# Patient Record
Sex: MALE | Race: WHITE | NOT HISPANIC OR LATINO | Employment: UNEMPLOYED | URBAN - METROPOLITAN AREA
[De-identification: names, ages, dates, MRNs, and addresses within clinical notes are randomized per-mention and may not be internally consistent; named-entity substitution may affect disease eponyms.]

---

## 2024-09-16 LAB — HBA1C MFR BLD: 6.9 % (ref 4.8–5.6)

## 2024-09-27 ENCOUNTER — OFFICE VISIT (OUTPATIENT)
Dept: INTERNAL MEDICINE | Age: 74
End: 2024-09-27
Payer: MEDICARE

## 2024-09-27 VITALS
DIASTOLIC BLOOD PRESSURE: 90 MMHG | BODY MASS INDEX: 46.65 KG/M2 | WEIGHT: 315 LBS | HEIGHT: 69 IN | OXYGEN SATURATION: 92 % | HEART RATE: 113 BPM | TEMPERATURE: 97.8 F | SYSTOLIC BLOOD PRESSURE: 130 MMHG

## 2024-09-27 DIAGNOSIS — Z76.89 ENCOUNTER TO ESTABLISH CARE: ICD-10-CM

## 2024-09-27 DIAGNOSIS — F32.A DEPRESSIVE DISORDER: ICD-10-CM

## 2024-09-27 DIAGNOSIS — E11.9 TYPE 2 DIABETES MELLITUS WITHOUT COMPLICATION, WITH LONG-TERM CURRENT USE OF INSULIN: ICD-10-CM

## 2024-09-27 DIAGNOSIS — E55.9 VITAMIN D DEFICIENCY: ICD-10-CM

## 2024-09-27 DIAGNOSIS — Z12.5 PROSTATE CANCER SCREENING: ICD-10-CM

## 2024-09-27 DIAGNOSIS — T78.40XS ALLERGY, SEQUELA: ICD-10-CM

## 2024-09-27 DIAGNOSIS — Z79.4 TYPE 2 DIABETES MELLITUS WITHOUT COMPLICATION, WITH LONG-TERM CURRENT USE OF INSULIN: ICD-10-CM

## 2024-09-27 DIAGNOSIS — J44.9 COPD MIXED TYPE: ICD-10-CM

## 2024-09-27 DIAGNOSIS — K21.9 GASTROESOPHAGEAL REFLUX DISEASE WITHOUT ESOPHAGITIS: ICD-10-CM

## 2024-09-27 DIAGNOSIS — Z11.59 NEED FOR HEPATITIS C SCREENING TEST: ICD-10-CM

## 2024-09-27 DIAGNOSIS — E06.3 HYPOTHYROIDISM DUE TO HASHIMOTO'S THYROIDITIS: ICD-10-CM

## 2024-09-27 DIAGNOSIS — G47.33 OBSTRUCTIVE SLEEP APNEA SYNDROME: ICD-10-CM

## 2024-09-27 DIAGNOSIS — E03.8 HYPOTHYROIDISM DUE TO HASHIMOTO'S THYROIDITIS: ICD-10-CM

## 2024-09-27 DIAGNOSIS — I48.11 LONGSTANDING PERSISTENT ATRIAL FIBRILLATION: Primary | ICD-10-CM

## 2024-09-27 DIAGNOSIS — I10 ESSENTIAL HYPERTENSION: ICD-10-CM

## 2024-09-27 DIAGNOSIS — E78.2 MIXED HYPERLIPIDEMIA: ICD-10-CM

## 2024-09-27 PROBLEM — I48.91 ATRIAL FIBRILLATION: Status: ACTIVE | Noted: 2017-09-26

## 2024-09-27 PROBLEM — J44.1 ACUTE EXACERBATION OF CHRONIC OBSTRUCTIVE PULMONARY DISEASE: Status: ACTIVE | Noted: 2017-09-26

## 2024-09-27 PROBLEM — E03.9 HYPOTHYROIDISM: Status: ACTIVE | Noted: 2017-09-26

## 2024-09-27 PROBLEM — E66.9 OBESITY: Status: ACTIVE | Noted: 2017-12-20

## 2024-09-27 LAB
25(OH)D3 SERPL-MCNC: 7.5 NG/ML (ref 30–100)
HCV AB SER QL: NORMAL
PSA SERPL-MCNC: 0.06 NG/ML (ref 0–4)

## 2024-09-27 PROCEDURE — 82306 VITAMIN D 25 HYDROXY: CPT | Performed by: NURSE PRACTITIONER

## 2024-09-27 PROCEDURE — 86803 HEPATITIS C AB TEST: CPT | Performed by: NURSE PRACTITIONER

## 2024-09-27 PROCEDURE — 1126F AMNT PAIN NOTED NONE PRSNT: CPT | Performed by: NURSE PRACTITIONER

## 2024-09-27 PROCEDURE — 1160F RVW MEDS BY RX/DR IN RCRD: CPT | Performed by: NURSE PRACTITIONER

## 2024-09-27 PROCEDURE — 36415 COLL VENOUS BLD VENIPUNCTURE: CPT | Performed by: NURSE PRACTITIONER

## 2024-09-27 PROCEDURE — G0103 PSA SCREENING: HCPCS | Performed by: NURSE PRACTITIONER

## 2024-09-27 PROCEDURE — 1159F MED LIST DOCD IN RCRD: CPT | Performed by: NURSE PRACTITIONER

## 2024-09-27 PROCEDURE — 3080F DIAST BP >= 90 MM HG: CPT | Performed by: NURSE PRACTITIONER

## 2024-09-27 PROCEDURE — 99204 OFFICE O/P NEW MOD 45 MIN: CPT | Performed by: NURSE PRACTITIONER

## 2024-09-27 PROCEDURE — 3075F SYST BP GE 130 - 139MM HG: CPT | Performed by: NURSE PRACTITIONER

## 2024-09-27 RX ORDER — DILTIAZEM HYDROCHLORIDE 120 MG/1
120 TABLET, FILM COATED ORAL DAILY
COMMUNITY

## 2024-09-27 RX ORDER — GABAPENTIN 300 MG/1
1 CAPSULE ORAL 2 TIMES DAILY
COMMUNITY
End: 2024-09-27 | Stop reason: SDUPTHER

## 2024-09-27 RX ORDER — VENLAFAXINE HYDROCHLORIDE 150 MG/1
150 CAPSULE, EXTENDED RELEASE ORAL DAILY
COMMUNITY

## 2024-09-27 RX ORDER — INSULIN HUMAN 500 [IU]/ML
150 INJECTION, SOLUTION SUBCUTANEOUS 2 TIMES DAILY
COMMUNITY
Start: 2024-09-06

## 2024-09-27 RX ORDER — GABAPENTIN 300 MG/1
300 CAPSULE ORAL 2 TIMES DAILY
Qty: 60 CAPSULE | Refills: 2 | Status: SHIPPED | OUTPATIENT
Start: 2024-09-27

## 2024-09-27 RX ORDER — LORATADINE 10 MG/1
10 TABLET ORAL DAILY
Qty: 90 TABLET | Refills: 0 | Status: SHIPPED | OUTPATIENT
Start: 2024-09-27

## 2024-09-27 RX ORDER — LISINOPRIL 40 MG/1
1 TABLET ORAL DAILY
COMMUNITY

## 2024-10-14 ENCOUNTER — OFFICE VISIT (OUTPATIENT)
Dept: CARDIOLOGY | Facility: CLINIC | Age: 74
End: 2024-10-14
Payer: MEDICARE

## 2024-10-14 VITALS
WEIGHT: 315 LBS | DIASTOLIC BLOOD PRESSURE: 91 MMHG | SYSTOLIC BLOOD PRESSURE: 150 MMHG | HEART RATE: 112 BPM | BODY MASS INDEX: 46.65 KG/M2 | HEIGHT: 69 IN

## 2024-10-14 DIAGNOSIS — E66.01 MORBID (SEVERE) OBESITY DUE TO EXCESS CALORIES: ICD-10-CM

## 2024-10-14 DIAGNOSIS — I10 HYPERTENSION, ESSENTIAL: ICD-10-CM

## 2024-10-14 DIAGNOSIS — R06.02 SHORTNESS OF BREATH: ICD-10-CM

## 2024-10-14 DIAGNOSIS — I48.92 ATRIAL FLUTTER WITH RAPID VENTRICULAR RESPONSE: Primary | ICD-10-CM

## 2024-10-14 RX ORDER — DILTIAZEM HYDROCHLORIDE 240 MG/1
240 CAPSULE, COATED, EXTENDED RELEASE ORAL DAILY
Qty: 90 CAPSULE | Refills: 3 | Status: SHIPPED | OUTPATIENT
Start: 2024-10-14

## 2024-10-14 NOTE — PROGRESS NOTES
Chief Complaint  Atrial Fibrillation    Subjective            Vineet Thomas presents to [facility] CARDIOLOGY  Atrial Fibrillation  Symptoms include shortness of breath. Symptoms are negative for chest pain. Past medical history includes atrial fibrillation.       74-year-old white male.  He is a new patient to me.  He has seen a cardiologist previously and has known persistent atrial fibrillation and had cardioversions twice in the past many years ago.  Based on what limited information there is in the chart it looks like he was followed by this cardiology provider through August 2023 and was in sinus rhythm based on that short office note.  The patient has been referred to me for atrial fibrillation.  I believe he saw primary care recently but there is no documentation currently available.  The patient denies chest pain, palpitations.He does have shortness of breath at baseline worse with exertion.  He is not on anticoagulation.  He used to be on Pradaxa but it was too expensive and he has not been on anticoagulation for several years.  He does have sleep apnea and uses a CPAP.    PMH  Past Medical History:   Diagnosis Date    COPD (chronic obstructive pulmonary disease)     Diabetes     Hypertension          SURGICALHX  Past Surgical History:   Procedure Laterality Date    ROTATOR CUFF REPAIR Bilateral         SOC  Social History     Socioeconomic History    Marital status:    Tobacco Use    Smoking status: Never     Passive exposure: Never    Smokeless tobacco: Never   Vaping Use    Vaping status: Never Used   Substance and Sexual Activity    Alcohol use: Never    Drug use: Never    Sexual activity: Defer         FAMHX  Family History   Adopted: Yes   Problem Relation Age of Onset    Skin cancer Daughter           ALLERGY  No Known Allergies     MEDSCURRENT    Current Outpatient Medications:     gabapentin (NEURONTIN) 300 MG capsule, Take 1 capsule by mouth 2 (Two) Times a Day., Disp: 60  "capsule, Rfl: 2    HumuLIN R U-500 KwikPen 500 UNIT/ML solution pen-injector CONCENTRATED injection, Inject 150 Units under the skin into the appropriate area as directed 2 (Two) Times a Day., Disp: , Rfl:     Insulin Pen Needle 31G X 8 MM misc, Inject 1 Needle as directed 2 (Two) Times a Day., Disp: 90 each, Rfl: 5    lisinopril (PRINIVIL,ZESTRIL) 40 MG tablet, Take 1 tablet by mouth Daily., Disp: , Rfl:     loratadine (Claritin) 10 MG tablet, Take 1 tablet by mouth Daily., Disp: 90 tablet, Rfl: 0    venlafaxine XR (EFFEXOR-XR) 150 MG 24 hr capsule, Take 1 capsule by mouth Daily., Disp: , Rfl:     apixaban (Eliquis) 5 MG tablet tablet, Take 1 tablet by mouth Every 12 (Twelve) Hours., Disp: 180 tablet, Rfl: 3    dilTIAZem CD (CARDIZEM CD) 240 MG 24 hr capsule, Take 1 capsule by mouth Daily., Disp: 90 capsule, Rfl: 3      Review of Systems   Constitutional: Positive for malaise/fatigue.   HENT: Negative.     Eyes: Negative.    Cardiovascular:  Positive for dyspnea on exertion. Negative for chest pain.   Respiratory:  Positive for shortness of breath.    Endocrine: Negative.    Hematologic/Lymphatic: Negative.    Skin: Negative.    Musculoskeletal: Negative.    Gastrointestinal: Negative.    Genitourinary: Negative.    Neurological: Negative.    Psychiatric/Behavioral: Negative.          Objective     /91   Pulse 112   Ht 175.3 cm (69\")   Wt (!) 152 kg (336 lb)   BMI 49.62 kg/m²       General Appearance:   well developed  well nourished, morbidly obese  HENT:   oropharynx moist  lips not cyanotic  Neck:  thyroid not enlarged  supple  Respiratory:  no respiratory distress  normal breath sounds  no rales  Cardiovascular:  no jugular venous distention  regular rhythm, rapid rate  apical impulse normal  S1 normal, S2 normal  no S3, no S4   no murmur  no rub, no thrill  carotid pulses normal; no bruit  pedal pulses normal  lower extremity edema: Mild  Musculoskeletal:  no clubbing of fingers.   normocephalic, " head atraumatic  Skin:   warm, dry  Psychiatric:  judgement and insight appropriate  normal mood and affect      Result Review :     The following data was reviewed by: Chau Solis MD on 10/14/2024:        Lipid Panel          9/16/2024    15:23   Lipid Panel   Total Cholesterol 191.0       HDL Cholesterol 43       LDL Cholesterol  120.8          Details          This result is from an external source.                 Data reviewed : Primary care records reviewed, limited old cardiology records reviewed        ECG 12 Lead    Date/Time: 10/14/2024 1:26 PM  Performed by: YAMILA Solis MD    Authorized by: YAMILA Solis MD  Comparison: not compared with previous ECG   Previous ECG: no previous ECG available  Rhythm: atrial flutter  Conduction: conduction normal  ST Segments: ST segments normal  T Waves: T waves normal  QRS axis: normal  Other findings: non-specific ST-T wave changes    Clinical impression: abnormal EKG               Assessment and Plan        ASSESSMENT:  Encounter Diagnoses   Name Primary?    Atrial flutter with rapid ventricular response Yes    Shortness of breath     Morbid (severe) obesity due to excess calories     Hypertension, essential          PLAN:    1.  Atrial flutter with rapid ventricular response-duration is unknown given that he is not specifically symptomatic.  It was noted in a short office note from a previous cardiologist August 2023 that he was in sinus rhythm that day.  I have no previous EKG for comparison.  His rate is not controlled today.  He has significant embolic risk due to age and risk factors.  I am starting him on Eliquis 5 mg twice a day, we will try to get him qualified for patient assistance.  I gave him 1 month of samples today.  I am increasing the diltiazem to 240 mg daily.  An echo will be scheduled.  At this time I think a rate control strategy is likely going to be the proper course moving forward.  2.  Morbid obesity due to excess  calories-weight loss counseling  3.  Essential hypertension-elevated today, increasing diltiazem should help    Checkup in about 4 weeks          Patient was given instructions and counseling regarding his condition or for health maintenance advice. Please see specific information pulled into the AVS if appropriate.             YAMILA Solis MD  10/14/2024    13:24 EDT

## 2024-10-16 ENCOUNTER — TELEPHONE (OUTPATIENT)
Dept: CARDIOLOGY | Facility: CLINIC | Age: 74
End: 2024-10-16
Payer: MEDICARE

## 2024-10-16 NOTE — TELEPHONE ENCOUNTER
RAIN PT.  PT CALLED AND ADVISED HE WILL BE FAXING HIS ELIQUIS PAP APPLICATION HIMSELF TO [employer] TODAY.  I FAXED HIS INS CARDS AND PRESCRIBER PAGE TO [employer] AND SCANNED IN MEDIA.  WAITING ON A DETERMINATION.

## 2024-10-17 NOTE — TELEPHONE ENCOUNTER
RECEIVED EDITH MENDOZA DENIAL LETTER BY FAX FROM [facility] AND SCANNED COPY IN MEDIA.  PT NOTIFIED.     DENIED DUE TO DOCUMENTATION OF 3% OUT OF POCKET PRESCRIPTION EXPENSES, BASED ON HOUSEHOLD ADJUSTED GROSS INCOME, NOT MET.     ADVISED PT ONCE 3% OUT OF POCKET PRESCRIPTION EXPENSES HAVE BEEN MET TO GET A STATEMENT FROM THE PHARMACY AND BRING TO THE OFFICE AND I WILL SUBMIT IT FOR ADDITIONAL REVIEW.  PT STILL HAS SOME MEDICATION AND DOES NOT NEED SAMPLES AT THIS TIME.

## 2024-10-18 ENCOUNTER — OFFICE VISIT (OUTPATIENT)
Dept: INTERNAL MEDICINE | Age: 74
End: 2024-10-18
Payer: MEDICARE

## 2024-10-18 VITALS
HEART RATE: 114 BPM | SYSTOLIC BLOOD PRESSURE: 128 MMHG | WEIGHT: 315 LBS | OXYGEN SATURATION: 95 % | BODY MASS INDEX: 46.65 KG/M2 | TEMPERATURE: 97.8 F | DIASTOLIC BLOOD PRESSURE: 78 MMHG | HEIGHT: 69 IN

## 2024-10-18 DIAGNOSIS — Z79.4 TYPE 2 DIABETES MELLITUS WITHOUT COMPLICATION, WITH LONG-TERM CURRENT USE OF INSULIN: ICD-10-CM

## 2024-10-18 DIAGNOSIS — E06.3 HYPOTHYROIDISM DUE TO HASHIMOTO'S THYROIDITIS: ICD-10-CM

## 2024-10-18 DIAGNOSIS — I87.2 VENOUS STASIS DERMATITIS: ICD-10-CM

## 2024-10-18 DIAGNOSIS — F32.A DEPRESSIVE DISORDER: ICD-10-CM

## 2024-10-18 DIAGNOSIS — I48.11 LONGSTANDING PERSISTENT ATRIAL FIBRILLATION: ICD-10-CM

## 2024-10-18 DIAGNOSIS — I10 ESSENTIAL HYPERTENSION: ICD-10-CM

## 2024-10-18 DIAGNOSIS — E11.9 TYPE 2 DIABETES MELLITUS WITHOUT COMPLICATION, WITH LONG-TERM CURRENT USE OF INSULIN: ICD-10-CM

## 2024-10-18 DIAGNOSIS — G47.33 OBSTRUCTIVE SLEEP APNEA SYNDROME: ICD-10-CM

## 2024-10-18 DIAGNOSIS — Z12.11 COLON CANCER SCREENING: ICD-10-CM

## 2024-10-18 DIAGNOSIS — J44.9 COPD MIXED TYPE: ICD-10-CM

## 2024-10-18 DIAGNOSIS — K63.5 POLYP OF COLON, UNSPECIFIED PART OF COLON, UNSPECIFIED TYPE: ICD-10-CM

## 2024-10-18 DIAGNOSIS — E55.9 VITAMIN D DEFICIENCY: ICD-10-CM

## 2024-10-18 DIAGNOSIS — E78.2 MIXED HYPERLIPIDEMIA: ICD-10-CM

## 2024-10-18 DIAGNOSIS — Z23 NEED FOR VACCINATION: Primary | ICD-10-CM

## 2024-10-18 RX ORDER — MUPIROCIN 20 MG/G
1 OINTMENT TOPICAL 3 TIMES DAILY
Qty: 22 G | Refills: 0 | Status: SHIPPED | OUTPATIENT
Start: 2024-10-18 | End: 2024-10-28

## 2024-10-18 RX ORDER — CHOLECALCIFEROL (VITAMIN D3) 50 MCG
2000 TABLET ORAL
Qty: 30 EACH | Refills: 11 | Status: SHIPPED | OUTPATIENT
Start: 2024-10-18

## 2024-10-18 NOTE — PATIENT INSTRUCTIONS
See diabetic specialist  Fu with cardiology as scheduled  See gastroenterologist for colonoscopy

## 2024-10-18 NOTE — PROGRESS NOTES
Subjective   The ABCs of the Annual Wellness Visit  Medicare Wellness Visit      Vineet Thomas is a 74 y.o. patient who presents for a Medicare Wellness Visit.    The following portions of the patient's history were reviewed and   updated as appropriate: allergies, current medications, past family history, past medical history, past social history, past surgical history, and problem list.    Compared to one year ago, the patient's physical   health is better.  Compared to one year ago, the patient's mental   health is better.    Recent Hospitalizations:  He was not admitted to the hospital during the last year.     Current Medical Providers:  Patient Care Team:  Kelsey Morin APRN as PCP - General (Nurse Practitioner)  Yesenia Hay-diabetes  Dr Solis-cardiology         Outpatient Medications Prior to Visit   Medication Sig Dispense Refill   • apixaban (Eliquis) 5 MG tablet tablet Take 1 tablet by mouth Every 12 (Twelve) Hours. 180 tablet 3   • dilTIAZem CD (CARDIZEM CD) 240 MG 24 hr capsule Take 1 capsule by mouth Daily. 90 capsule 3   • gabapentin (NEURONTIN) 300 MG capsule Take 1 capsule by mouth 2 (Two) Times a Day. 60 capsule 2   • HumuLIN R U-500 KwikPen 500 UNIT/ML solution pen-injector CONCENTRATED injection Inject 150 Units under the skin into the appropriate area as directed 2 (Two) Times a Day.     • Insulin Pen Needle 31G X 8 MM misc Inject 1 Needle as directed 2 (Two) Times a Day. 90 each 5   • lisinopril (PRINIVIL,ZESTRIL) 40 MG tablet Take 1 tablet by mouth Daily.     • loratadine (Claritin) 10 MG tablet Take 1 tablet by mouth Daily. 90 tablet 0   • venlafaxine XR (EFFEXOR-XR) 150 MG 24 hr capsule Take 1 capsule by mouth Daily.     • apixaban (ELIQUIS) 5 MG tablet tablet Take 1 tablet by mouth 2 (Two) Times a Day. Lot # [id number]  Exp oct 2025  4 boxes = 56 tablets total 56 tablet 0     No facility-administered medications prior to visit.     No opioid medication identified on active medication  "list. I have reviewed chart for other potential  high risk medication/s and harmful drug interactions in the elderly.      Aspirin is not on active medication list.  Aspirin use is contraindicated for this patient due to: current use of Eliquis.  .    Patient Active Problem List   Diagnosis   • Acute exacerbation of chronic obstructive pulmonary disease   • Atrial fibrillation   • Depressive disorder   • Diabetes mellitus   • Essential hypertension   • Gastroesophageal reflux disease   • Mixed hyperlipidemia   • Hypothyroidism   • Obstructive sleep apnea syndrome   • Obesity   • Type 2 diabetes mellitus without complication   • Vitamin D deficiency     Advance Care Planning Advance Directive is not on file.  ACP discussion was held with the patient during this visit. Patient does not have an advance directive, information provided.            Objective   Vitals:    10/18/24 1254   BP: 128/78   BP Location: Left arm   Patient Position: Sitting   Cuff Size: Adult   Pulse: 114   Temp: 97.8 °F (36.6 °C)   TempSrc: Skin   SpO2: 95%   Weight: (!) 153 kg (338 lb 3.2 oz)   Height: 175.3 cm (69.02\")   PainSc: 0-No pain       Estimated body mass index is 49.92 kg/m² as calculated from the following:    Height as of this encounter: 175.3 cm (69.02\").    Weight as of this encounter: 153 kg (338 lb 3.2 oz).            Does the patient have evidence of cognitive impairment? No  Lab Results   Component Value Date    CHLPL 191.0 09/16/2024    HDL 43 09/16/2024    .8 09/16/2024                                                                                                Health  Risk Assessment    Smoking Status:  Social History     Tobacco Use   Smoking Status Never   • Passive exposure: Never   Smokeless Tobacco Never     Alcohol Consumption:  Social History     Substance and Sexual Activity   Alcohol Use Never       Fall Risk Screen  STEADI Fall Risk Assessment was completed, and patient is at LOW risk for falls.Assessment " completed on:10/18/2024    Depression Screening:      10/18/2024     1:00 PM   PHQ-2/PHQ-9 Depression Screening   Little interest or pleasure in doing things Not at all   Feeling down, depressed, or hopeless Not at all     Health Habits and Functional and Cognitive Screening:      10/18/2024     1:00 PM   Functional & Cognitive Status   Do you have difficulty preparing food and eating? No   Do you have difficulty bathing yourself, getting dressed or grooming yourself? No   Do you have difficulty using the toilet? No   Do you have difficulty moving around from place to place? No   Do you have trouble with steps or getting out of a bed or a chair? No   Current Diet Unhealthy Diet   Dental Exam Other   Eye Exam Not up to date   Exercise (times per week) 3 times per week   Current Exercises Include House Cleaning;Yard Work;Walking;Other   Do you need help using the phone?  No   Are you deaf or do you have serious difficulty hearing?  Yes   Do you need help to go to places out of walking distance? No   Do you need help shopping? No   Do you need help preparing meals?  No   Do you need help with housework?  No   Do you need help with laundry? No   Do you need help taking your medications? No   Do you need help managing money? No   Do you ever drive or ride in a car without wearing a seat belt? Yes   Have you felt unusual stress, anger or loneliness in the last month? No   Who do you live with? Child   If you need help, do you have trouble finding someone available to you? No   Have you been bothered in the last four weeks by sexual problems? No   Do you have difficulty concentrating, remembering or making decisions? No           Age-appropriate Screening Schedule:  Refer to the list below for future screening recommendations based on patient's age, sex and/or medical conditions. Orders for these recommended tests are listed in the plan section. The patient has been provided with a written plan.    Health Maintenance  List  Health Maintenance   Topic Date Due   • URINE MICROALBUMIN  Never done   • COLORECTAL CANCER SCREENING  Never done   • DIABETIC EYE EXAM  Never done   • TDAP/TD VACCINES (1 - Tdap) Never done   • ZOSTER VACCINE (1 of 2) Never done   • INFLUENZA VACCINE  08/01/2024   • COVID-19 Vaccine (2 - 2023-24 season) 09/01/2024   • DIABETIC FOOT EXAM  Never done   • HEMOGLOBIN A1C  03/16/2025   • LIPID PANEL  09/16/2025   • BMI FOLLOWUP  09/27/2025   • ANNUAL WELLNESS VISIT  10/18/2025   • HEPATITIS C SCREENING  Completed   • Pneumococcal Vaccine 65+  Completed                                                                                                                                                CMS Preventative Services Quick Reference  Risk Factors Identified During Encounter  Immunizations Discussed/Encouraged: Tdap, Influenza, Shingrix, COVID19, and RSV (Respiratory Syncytial Virus)  Vision Screening Recommended    The above risks/problems have been discussed with the patient.  Pertinent information has been shared with the patient in the After Visit Summary.  An After Visit Summary and PPPS were made available to the patient.    Follow Up:   Next Medicare Wellness visit to be scheduled in 1 year.          Additional E&M Note during same encounter follows:  Patient has multiple medical problems which are significant and separately identifiable that require additional work above and beyond the Medicare Wellness Visit.      Chief Complaint  Medicare Wellness-subsequent (Pt was here 2 weeks ago and had labs done. )    Vineet Thomas is a 74 y.o. male who presents to [facility] for his Medicare wellness and follow-up on his diabetes hypertension atrial fibs hyperlipidemia and COPD.  I have reviewed with him his labs we did review the labs previously drawn through [facility].  I have discussed with him the need to take a vitamin D supplement and he is willing.  Last A1c  "in good control    History of Present Illness  The patient presents for a Medicare wellness visit.  Follow-up on diabetes hypertension atrial fibs hyperlipidemia COPD neuropathy and vitamin D deficiency.    He is scheduled to see a diabetes specialist, Yesenia Sotelo, on Monday. His blood sugar levels fluctuate depending on his diet. He administers 65 units of insulin at night and 110 units in the morning.    He recently had a consultation with his cardiologist, Dr. Mccartney. He has a history of atrial fibrillation, which has converted twice. His EKG showed a flutter per cardiology note. He has experienced few palpitations and occasionally associated shortness of breath. He is currently taking Eliquis.    He has no history of lung issues.  He has been monitoring his O2 levels, which are now around 92 or 93.  Lowest but sometimes up to as high as 98.  On room air.  He uses a CPAP machine at night. He he quit smoking in 2007.    His last colonoscopy was 7 years ago, during which colon polyps were discovered he is due      And sometime since he had an eye exam. He has undergone cataract surgery in one eye and needs to schedule an appointment with an ophthalmologist.    He has itching in his legs every morning and swelling in his feet. He has consulted a foot specialist in [place]. His son assists him with nail trimming.  Has not had his nails 2 years.  Very dry skin in his lower extremities with swelling, his neuropathy is better since we have refilled his gabapentin prescription.        SOCIAL HISTORY  He quit smoking in 2007.    FAMILY HISTORY  There is no family history of colon cancer.    IMMUNIZATIONS  He received pneumonia vaccine.       Objective   Vital Signs:   Vitals:    10/18/24 1254   BP: 128/78   BP Location: Left arm   Patient Position: Sitting   Cuff Size: Adult   Pulse: 114   Temp: 97.8 °F (36.6 °C)   TempSrc: Skin   SpO2: 95%   Weight: (!) 153 kg (338 lb 3.2 oz)   Height: 175.3 cm (69.02\")   PainSc: " 0-No pain       Wt Readings from Last 3 Encounters:   10/18/24 (!) 153 kg (338 lb 3.2 oz)   10/14/24 (!) 152 kg (336 lb)   09/27/24 (!) 151 kg (332 lb 6.4 oz)     BP Readings from Last 3 Encounters:   10/18/24 128/78   10/14/24 150/91   09/27/24 130/90       Physical Exam  Vitals and nursing note reviewed.   Constitutional:       Appearance: Normal appearance.   HENT:      Head: Normocephalic.   Eyes:      Extraocular Movements: Extraocular movements intact.      Pupils: Pupils are equal, round, and reactive to light.   Cardiovascular:      Rate and Rhythm: Normal rate and regular rhythm.      Pulses: Normal pulses.   Pulmonary:      Effort: Pulmonary effort is normal.      Breath sounds: Normal breath sounds.   Abdominal:      Palpations: Abdomen is soft.   Musculoskeletal:         General: Normal range of motion.      Cervical back: Normal range of motion.   Skin:     General: Skin is warm and dry.   Neurological:      General: No focal deficit present.      Mental Status: He is alert.   Psychiatric:         Mood and Affect: Mood normal.         Behavior: Behavior normal.         Thought Content: Thought content normal.         Physical Exam  Vital Signs  O2 saturation is at 95.       The following data was reviewed by ALEENA Urbina on 10/18/2024  CMP   CBC   LIPID   Lipid Panel          9/16/2024    15:23   Lipid Panel   Total Cholesterol 191.0       HDL Cholesterol 43       LDL Cholesterol  120.8          Details          This result is from an external source.             TSH   A1C   PSA   PSA          9/27/2024    15:14   PSA   PSA 0.061          Assessment & Plan   Diagnoses and all orders for this visit:    1. Need for vaccination (Primary)  -     Pneumococcal Conjugate Vaccine 20-Valent (PCV20)    2. Polyp of colon, unspecified part of colon, unspecified type  -     Ambulatory Referral to Gastroenterology    3. Colon cancer screening  -     Ambulatory Referral to Gastroenterology    4. Type 2  diabetes mellitus without complication, with long-term current use of insulin  -     Ambulatory Referral for Diabetic Eye Exam-Ophthalmology  -     Ambulatory Referral to Podiatry    5. Longstanding persistent atrial fibrillation    6. Hypothyroidism due to Hashimoto's thyroiditis    7. Vitamin D deficiency    8. COPD mixed type    9. Essential hypertension    10. Mixed hyperlipidemia    11. Obstructive sleep apnea syndrome    12. Depressive disorder    13. Venous stasis dermatitis  -     mupirocin (BACTROBAN) 2 % ointment; Apply 1 Application topically to the appropriate area as directed 3 (Three) Times a Day for 10 days. Apply to affected area.  Dispense: 22 g; Refill: 0    Other orders  -     Cholecalciferol (Vitamin D3 Super Strength) 50 MCG (2000 UT) tablet; Take 1 tablet by mouth Every Morning.  Dispense: 30 each; Refill: 11        Assessment & Plan  1. Vitamin D deficiency.  His vitamin D levels are significantly low. He does not currently take vitamin D supplements. A vitamin D supplement will be prescribed.    2. Diabetes Mellitus.  His A1c was satisfactory at 6.7 when tested at [facility]. He is on high doses of insulin, taking 65 units at night and 110 units in the morning. He is advised to follow up with the diabetic specialist, Yesenia, to discuss his insulin dosage. A urine sample will be collected during his next visit to monitor protein levels due to his diabetes.    3. Colon polyps.  He had a colonoscopy> 7 years ago in [place], where colon polyps were found. A referral to a gastroenterologist will be made for a colonoscopy.    4. Eye care.  He has not seen an eye doctor recently and needs an annual eye examination due to his diabetes. A referral will be provided to an ophthalmologist.    5. Leg itching.  He reports itching in his leg dry skin and occ bumps that scab over , his feet do swelling. An ointment similar to Bactroban will be prescribed. He is advised to inform if the condition worsens or if  there are signs of infection such as redness, drainage, or streaking.broken skin weeping or worsening He has not seen a foot specialist recently and has his son cut his nails. A referral to a podiatrist will be made for foot care, including nail cutting and a full exam.      6. Atrial fibrillation.  He has a history of atrial fibrillation and reports a heart flutter. He is advised to monitor his breathing closely. Fu with dr jerez as planned    8. Health Maintenance.  His PSA levels were within the normal range. He received a pneumonia shot but not the flu shot due to unavailability. He will get the flu shot at pharmacy also instructed to ask about Shingrix and RSV as well as possible Tdap.      He is to schedule with an eye doctor for his diabetic eye exam.  Follow-up  Return in 3 months or sooner if any issues arise.               FOLLOW UP  Return in about 3 months (around 1/18/2025).  Patient was given instructions and counseling regarding his condition or for health maintenance advice. Please see specific information pulled into the AVS if appropriate.     Patient or patient representative verbalized consent for the use of Ambient Listening during the visit with  ALEENA Urbina for chart documentation. 10/18/2024  13:48 EDT  ALEENA Urbina  10/18/24  13:49 EDT

## 2024-10-21 NOTE — TELEPHONE ENCOUNTER
RECEIVED ELIQUIS PAP APPROVAL LETTER VIA FAX AND SCANNED IN MEDIA.  PT NOTIFIED.    APPROVED 10/18/24-12/31/24

## 2024-10-31 ENCOUNTER — OFFICE VISIT (OUTPATIENT)
Dept: PODIATRY | Facility: CLINIC | Age: 74
End: 2024-10-31
Payer: MEDICARE

## 2024-10-31 VITALS
SYSTOLIC BLOOD PRESSURE: 150 MMHG | HEIGHT: 69 IN | OXYGEN SATURATION: 92 % | HEART RATE: 104 BPM | WEIGHT: 315 LBS | DIASTOLIC BLOOD PRESSURE: 86 MMHG | BODY MASS INDEX: 46.65 KG/M2

## 2024-10-31 DIAGNOSIS — Z79.4 TYPE 2 DIABETES MELLITUS WITH DIABETIC POLYNEUROPATHY, WITH LONG-TERM CURRENT USE OF INSULIN: Primary | ICD-10-CM

## 2024-10-31 DIAGNOSIS — E11.42 TYPE 2 DIABETES MELLITUS WITH DIABETIC POLYNEUROPATHY, WITH LONG-TERM CURRENT USE OF INSULIN: Primary | ICD-10-CM

## 2024-10-31 DIAGNOSIS — E66.9 OBESITY, UNSPECIFIED CLASS, UNSPECIFIED OBESITY TYPE, UNSPECIFIED WHETHER SERIOUS COMORBIDITY PRESENT: ICD-10-CM

## 2024-10-31 DIAGNOSIS — Z79.4 INSULIN-REQUIRING OR DEPENDENT TYPE II DIABETES MELLITUS: ICD-10-CM

## 2024-10-31 DIAGNOSIS — G62.9 NEUROPATHY: ICD-10-CM

## 2024-10-31 DIAGNOSIS — E11.9 INSULIN-REQUIRING OR DEPENDENT TYPE II DIABETES MELLITUS: ICD-10-CM

## 2024-10-31 NOTE — LETTER
October 31, 2024     ALEENA Urbina  [address]  [id number]  [place] [address]    Patient: Vineet Thomas   YOB: 1950   Date of Visit: 10/31/2024       Dear ALEENA Urbina:    Thank you for referring Vineet Thomas to me for evaluation. Below are the relevant portions of my assessment and plan of care.    Encounter Diagnosis and Orders:  Diagnoses and all orders for this visit:    1. Type 2 diabetes mellitus with diabetic polyneuropathy, with long-term current use of insulin (Primary)    2. Neuropathy    3. Insulin-requiring or dependent type II diabetes mellitus    4. Obesity, unspecified class, unspecified obesity type, unspecified whether serious comorbidity present        If you have questions, please do not hesitate to call me. I look forward to following Vineet along with you.         Sincerely,        [place] Lovelace DPM        CC: No Recipients

## 2024-10-31 NOTE — PROGRESS NOTES
[facility] - PODIATRY    Today's Date: 10/31/24    Patient Name: Vineet Thomas  MRN: [id number]  CSN: [id number]  PCP: Kelsey Morin APRN, Last PCP Visit:  10/18/2024  Referring Provider: Kelsey Morin, *    SUBJECTIVE     Chief Complaint   Patient presents with    Left Foot - Establish Care, Annual Exam, Diabetes    Right Foot - Establish Care, Annual Exam, Diabetes     HPI: Vineet Thomas, a 74 y.o.male, presents to clinic for a diabetic foot evaluation.    New, Established, New Problem:  new    Onset: Insidious    Nature:  IDDM    Patient controlling diabetes via:  insulin    Patient states there last blood glucose was:  159    Patient denies any fevers, chills, nausea, vomiting, shortness of breath, nor any other constitutional signs nor symptoms.    Neuropathy symptoms.    No other pedal complaints at this time.    Past Medical History:   Diagnosis Date    COPD (chronic obstructive pulmonary disease)     Diabetes     Hypertension      Past Surgical History:   Procedure Laterality Date    ROTATOR CUFF REPAIR Bilateral      Family History   Adopted: Yes   Problem Relation Age of Onset    Skin cancer Daughter      Social History     Socioeconomic History    Marital status:    Tobacco Use    Smoking status: Never     Passive exposure: Never    Smokeless tobacco: Never   Vaping Use    Vaping status: Never Used   Substance and Sexual Activity    Alcohol use: Never    Drug use: Never    Sexual activity: Defer     No Known Allergies  Current Outpatient Medications   Medication Sig Dispense Refill    apixaban (Eliquis) 5 MG tablet tablet Take 1 tablet by mouth Every 12 (Twelve) Hours. 180 tablet 3    Cholecalciferol (Vitamin D3 Super Strength) 50 MCG (2000 UT) tablet Take 1 tablet by mouth Every Morning. 30 each 11    dilTIAZem CD (CARDIZEM CD) 240 MG 24 hr capsule Take 1 capsule by mouth Daily. 90 capsule 3    gabapentin (NEURONTIN) 300 MG capsule Take 1 capsule by mouth 2 (Two) Times a  "Day. 60 capsule 2    HumuLIN R U-500 KwikPen 500 UNIT/ML solution pen-injector CONCENTRATED injection Inject 150 Units under the skin into the appropriate area as directed 2 (Two) Times a Day.      Insulin Pen Needle 31G X 8 MM misc Inject 1 Needle as directed 2 (Two) Times a Day. 90 each 5    lisinopril (PRINIVIL,ZESTRIL) 40 MG tablet Take 1 tablet by mouth Daily.      loratadine (Claritin) 10 MG tablet Take 1 tablet by mouth Daily. 90 tablet 0    venlafaxine XR (EFFEXOR-XR) 150 MG 24 hr capsule Take 1 capsule by mouth Daily.       No current facility-administered medications for this visit.     Review of Systems   Constitutional: Negative.    Neurological:  Positive for numbness.   All other systems reviewed and are negative.      OBJECTIVE     Vitals:    10/31/24 1445   BP: 150/86   Pulse: 104   SpO2: 92%       Body mass index is 50.06 kg/m².    No results found for: \"HGBA1C\"    No results found for: \"GLUCOSE\", \"CALCIUM\", \"NA\", \"K\", \"CO2\", \"CL\", \"BUN\", \"CREATININE\", \"EGFRIFAFRI\", \"EGFRIFNONA\", \"BCR\", \"ANIONGAP\"    Patient seen in no apparent distress.      PHYSICAL EXAM:     Foot/Ankle Exam    GENERAL  Diabetic foot exam performed    Appearance:  appears stated age and obese  Orientation:  AAOx3  Affect:  appropriate  Gait:  unimpaired  Assistance:  independent  Right shoe gear: casual shoe  Left shoe gear: casual shoe    VASCULAR     Right Foot Vascularity   Normal vascular exam    Dorsalis pedis:  2+  Posterior tibial:  2+  Skin temperature:  warm  Edema grading:  None  CFT:  < 3 seconds  Pedal hair growth:  Present  Varicosities:  none     Left Foot Vascularity   Normal vascular exam    Dorsalis pedis:  2+  Posterior tibial:  2+  Skin temperature:  warm  Edema grading:  None  CFT:  < 3 seconds  Pedal hair growth:  Present  Varicosities:  none     NEUROLOGIC     Right Foot Neurologic   Light touch sensation: diminished  Vibratory sensation: diminished  Hot/Cold sensation: diminished  Protective Sensation using " [place]-Fat[place]ah Monofilament:   Sites intact: 4  Sites tested: 10     Left Foot Neurologic   Light touch sensation: diminished  Vibratory sensation: diminished  Hot/Cold sensation:  diminished  Protective Sensation using [place]-Fatimah Monofilament:   Sites intact: 4  Sites tested: 10    MUSCLE STRENGTH     Right Foot Muscle Strength   Foot dorsiflexion:  4  Foot plantar flexion:  4  Foot inversion:  4  Foot eversion:  4     Left Foot Muscle Strength   Foot dorsiflexion:  4  Foot plantar flexion:  4  Foot inversion:  4  Foot eversion:  4    RANGE OF MOTION     Right Foot Range of Motion   Foot and ankle ROM within normal limits       Left Foot Range of Motion   Foot and ankle ROM within normal limits      DERMATOLOGIC      Right Foot Dermatologic   Skin  Right foot skin is intact.      Left Foot Dermatologic   Skin  Left foot skin is intact.     Diabetic Foot Exam Performed and Monofilament Test Performed    ASSESSMENT/PLAN     Diagnoses and all orders for this visit:    1. Type 2 diabetes mellitus with diabetic polyneuropathy, with long-term current use of insulin (Primary)    2. Neuropathy    3. Insulin-requiring or dependent type II diabetes mellitus    4. Obesity, unspecified class, unspecified obesity type, unspecified whether serious comorbidity present    Comprehensive lower extremity examination and evaluation was performed.    Discussed findings and treatment plan including risks, benefits, and treatment options with patient in detail. Patient agreed with treatment plan.    Medications and allergies reviewed.  Reviewed available blood glucose and HgB A1C lab values along with other pertinent labs.  These were discussed with the patient as to their importance of diabetic maintenance.    Diabetic foot exam performed and documented this date, compliant with [facility] required standards. Detail of findings as noted in physical exam.  Lower extremity Neurologic exam for diabetic patient performed and documented  this date, compliant with PQRS required standards. Detail of findings as noted in physical exam.  Advised patient importance of good routine lower extremity hygiene. Advised patient importance of evaluating for intact skin and pain free nail borders.  Advised patient to use mirror to evaluate plantar/ soles of feet for better visualization. Advised patient monitor and phone office to be seen if any cracking to skin, open lesions, painful nail borders or if nails become elongated prior to next visit. Advised patient importance of daily cleansing of lower extremities, followed by good skin cream to maintain normal hydration of skin. Also advised patient importance of close daily monitoring of blood sugar. Advised to regulate diet and medications to maintain control of blood sugar in optimal range. Contact primary care provider if difficulties maintaining blood sugar levels.  Advised Patient of presence of Diabetes Mellitus condition.  Advised Patient risk of progression and worsening or improvement, then return of condition.  Will monitor condition for any change in future. Treat with most appropriate treatment pending status of condition.  Counseled and advised patient extensively on nature and ramifications of diabetes. Standard instructions given to patient for good diabetic foot care and maintenance. Advised importance of careful monitoring to avoid break down and complications secondary to diabetes. Advised patient importance of strict maintenance of blood sugar control. Advised patient of possible ominous results from neglect of condition, i.e.: amputation/ loss of digits, feet and legs, or even death.  Patient states understands counseling, will monitor closely, continue good hygiene and routine diabetic foot care. Patient will contact office is questions or problems.      An After Visit Summary was printed and given to the patient at discharge, including (if requested) any available informative/educational  handouts regarding diagnosis, treatment, or medications. All questions were answered to patient/family satisfaction. Should symptoms fail to improve or worsen they agree to call or return to clinic or to go to the Emergency Department. Discussed the importance of following up with any needed screening tests/labs/specialist appointments and any requested follow-up recommended by me today. Importance of maintaining follow-up discussed and patient accepts that missed appointments can delay diagnosis and potentially lead to worsening of conditions.    Return in about 1 year (around 10/31/2025) for DFE., or sooner if acute issues arise.    This document has been electronically signed by Lucas Lovelace DPM on October 31, 2024 15:22 EDT

## 2024-11-05 ENCOUNTER — TELEPHONE (OUTPATIENT)
Dept: CARDIOLOGY | Facility: CLINIC | Age: 74
End: 2024-11-05
Payer: MEDICARE

## 2024-11-05 NOTE — TELEPHONE ENCOUNTER
[facility] received a fax that requires your attention. The document has been indexed to the patient’s chart for your review.      Reason for sending: EXTERNAL MEDICAL RECORD NOTIFICATION     Documents Description: ASSISTANCE ENDING-LAURA PAF-11.4.24    Name of Sender: LAURA PAZ     Date Indexed: 11.4.24

## 2024-11-14 ENCOUNTER — OFFICE VISIT (OUTPATIENT)
Dept: CARDIOLOGY | Facility: CLINIC | Age: 74
End: 2024-11-14
Payer: MEDICARE

## 2024-11-14 VITALS
DIASTOLIC BLOOD PRESSURE: 81 MMHG | HEART RATE: 111 BPM | BODY MASS INDEX: 46.65 KG/M2 | SYSTOLIC BLOOD PRESSURE: 162 MMHG | HEIGHT: 69 IN | WEIGHT: 315 LBS

## 2024-11-14 DIAGNOSIS — I10 HYPERTENSION, ESSENTIAL: ICD-10-CM

## 2024-11-14 DIAGNOSIS — E66.01 MORBID (SEVERE) OBESITY DUE TO EXCESS CALORIES: ICD-10-CM

## 2024-11-14 DIAGNOSIS — I48.92 ATRIAL FLUTTER WITH RAPID VENTRICULAR RESPONSE: Primary | ICD-10-CM

## 2024-11-14 RX ORDER — METOPROLOL TARTRATE 25 MG/1
25 TABLET, FILM COATED ORAL 2 TIMES DAILY
Qty: 180 TABLET | Refills: 3 | Status: SHIPPED | OUTPATIENT
Start: 2024-11-14

## 2024-11-14 NOTE — PROGRESS NOTES
Chief Complaint  Atrial flutter with rapid ventricular response    Subjective            Vineet Thomas presents to [facility] CARDIOLOGY  History of Present Illness    Vineet is here for follow-up evaluation management of persistent atrial fibrillation/flutter, essential hypertension, morbid obesity.  He has had 2 previous cardioversions in the past.  Currently pursuing a rate control strategy.  He is asymptomatic.  Denies palpitations or chest pain.  He has stable shortness of breath on exertion.  He has been compliant with his Eliquis, no bleeding problems.  He has obstructive sleep apnea and uses CPAP.    PMH  Past Medical History:   Diagnosis Date    COPD (chronic obstructive pulmonary disease)     Diabetes     Hypertension          SURGICALHX  Past Surgical History:   Procedure Laterality Date    ROTATOR CUFF REPAIR Bilateral         SOC  Social History     Socioeconomic History    Marital status:    Tobacco Use    Smoking status: Never     Passive exposure: Never    Smokeless tobacco: Never   Vaping Use    Vaping status: Never Used   Substance and Sexual Activity    Alcohol use: Never    Drug use: Never    Sexual activity: Defer         FAMHX  Family History   Adopted: Yes   Problem Relation Age of Onset    Skin cancer Daughter           ALLERGY  No Known Allergies     MEDSCURRENT    Current Outpatient Medications:     apixaban (Eliquis) 5 MG tablet tablet, Take 1 tablet by mouth Every 12 (Twelve) Hours., Disp: 180 tablet, Rfl: 3    Cholecalciferol (Vitamin D3 Super Strength) 50 MCG (2000 UT) tablet, Take 1 tablet by mouth Every Morning., Disp: 30 each, Rfl: 11    dilTIAZem CD (CARDIZEM CD) 240 MG 24 hr capsule, Take 1 capsule by mouth Daily., Disp: 90 capsule, Rfl: 3    gabapentin (NEURONTIN) 300 MG capsule, Take 1 capsule by mouth 2 (Two) Times a Day., Disp: 60 capsule, Rfl: 2    HumuLIN R U-500 KwikPen 500 UNIT/ML solution pen-injector CONCENTRATED injection, Inject 150 Units under the  "skin into the appropriate area as directed 2 (Two) Times a Day., Disp: , Rfl:     Insulin Pen Needle 31G X 8 MM misc, Inject 1 Needle as directed 2 (Two) Times a Day., Disp: 90 each, Rfl: 5    lisinopril (PRINIVIL,ZESTRIL) 40 MG tablet, Take 1 tablet by mouth Daily., Disp: , Rfl:     loratadine (Claritin) 10 MG tablet, Take 1 tablet by mouth Daily., Disp: 90 tablet, Rfl: 0    venlafaxine XR (EFFEXOR-XR) 150 MG 24 hr capsule, Take 1 capsule by mouth Daily., Disp: , Rfl:     metoprolol tartrate (LOPRESSOR) 25 MG tablet, Take 1 tablet by mouth 2 (Two) Times a Day., Disp: 180 tablet, Rfl: 3      Review of Systems   Cardiovascular:  Positive for dyspnea on exertion. Negative for chest pain, palpitations and syncope.   Hematologic/Lymphatic: Negative for bleeding problem.        Objective     /81   Pulse 111   Ht 175.3 cm (69.02\")   Wt (!) 151 kg (333 lb 12.8 oz)   BMI 49.27 kg/m²       General Appearance:   well developed  well nourished  HENT:   oropharynx moist  lips not cyanotic  Neck:  thyroid not enlarged  supple  Respiratory:  no respiratory distress  normal breath sounds  no rales  Cardiovascular:  no jugular venous distention  Irregularly irregular-mildly tachycardic.  apical impulse normal  S1 normal, S2 normal  no S3, no S4   no murmur  no rub, no thrill  carotid pulses normal; no bruit  pedal pulses normal  lower extremity edema: 1+  Musculoskeletal:  no clubbing of fingers.   normocephalic, head atraumatic  Skin:   warm, dry  Psychiatric:  judgement and insight appropriate  normal mood and affect      Result Review :     The following data was reviewed by: ALEENA Smith on 11/14/2024:        Lipid Panel          9/16/2024    15:23   Lipid Panel   Total Cholesterol 191.0       HDL Cholesterol 43       LDL Cholesterol  120.8          Details          This result is from an external source.                      Procedures      Vineet Thomas  reports that he has never smoked. He has never " been exposed to tobacco smoke. He has never used smokeless tobacco. I have educated him on the risk of diseases from using tobacco products such as cancer, COPD, and heart disease.                Assessment and Plan        ASSESSMENT:  Encounter Diagnoses   Name Primary?    Atrial flutter with rapid ventricular response Yes    Hypertension, essential     Morbid (severe) obesity due to excess calories          PLAN:    1.  Atrial flutter, asymptomatic.  He remains mildly tachycardic.  Continue diltiazem 240 mg daily.  Will add Lopressor 25 mg twice a day as well.  Will check on the status of his echo since that has not been completed.  Continue anticoagulation.  2.  Essential hypertension, elevated.  Adding Lopressor should help with this as well.  3.  Morbid obesity, counseled.    Check up in about 4 weeks.      Patient was given instructions and counseling regarding his condition or for health maintenance advice. Please see specific information pulled into the AVS if appropriate.           Sandie Naylor, APRN   11/14/2024  11:43 EST

## 2024-11-21 ENCOUNTER — PREP FOR SURGERY (OUTPATIENT)
Dept: OTHER | Facility: HOSPITAL | Age: 74
End: 2024-11-21
Payer: MEDICARE

## 2024-11-21 ENCOUNTER — CLINICAL SUPPORT (OUTPATIENT)
Dept: GASTROENTEROLOGY | Facility: CLINIC | Age: 74
End: 2024-11-21
Payer: MEDICARE

## 2024-11-21 DIAGNOSIS — Z12.11 SCREENING FOR MALIGNANT NEOPLASM OF COLON: Primary | ICD-10-CM

## 2024-11-21 RX ORDER — PEG-3350, SODIUM SULFATE, SODIUM CHLORIDE, POTASSIUM CHLORIDE, SODIUM ASCORBATE AND ASCORBIC ACID 7.5-2.691G
KIT ORAL
Qty: 1000 ML | Refills: 0 | Status: SHIPPED | OUTPATIENT
Start: 2024-11-21

## 2024-11-21 NOTE — PROGRESS NOTES
Vineet Serratoe  1950  74 y.o.    Reason for call: Screening Colonoscopy  Prep prescribed: Moviprep  Prep instructions reviewed with patient and sent to patient via regular mail to the home address on file  Is the patient currently on any injectable or oral medications for weight loss or diabetes? No  Clearance needed? Yes  If yes, what clearance is needed? Blood thinner and Cardiology  Clearance has been requested from Dr. Natalio Solis  The patient has been scheduled for: Colonoscopy    If scheduled for screening colonoscopy Vineet Thomas is aware they have been scheduled for a screening colonoscopy. Patient has expressed they are not having any symptoms at all.     After your procedure, you will be contacted with results. Please confirm the best phone # to reach the patient: [phone]  Family history of colon cancer? No  If yes, indicate relative: N/A  Tentative Procedure Date: 01/15/2025    Date/Place of last Scope: 2017 - [place], [place]  Able to obtain report? no    Family History   Adopted: Yes   Problem Relation Age of Onset    Skin cancer Daughter      Past Medical History:   Diagnosis Date    COPD (chronic obstructive pulmonary disease)     Diabetes     Hypertension      No Known Allergies  Past Surgical History:   Procedure Laterality Date    ROTATOR CUFF REPAIR Bilateral      Social History     Socioeconomic History    Marital status:    Tobacco Use    Smoking status: Never     Passive exposure: Never    Smokeless tobacco: Never   Vaping Use    Vaping status: Never Used   Substance and Sexual Activity    Alcohol use: Never    Drug use: Never    Sexual activity: Defer       Current Outpatient Medications:     apixaban (Eliquis) 5 MG tablet tablet, Take 1 tablet by mouth Every 12 (Twelve) Hours., Disp: 180 tablet, Rfl: 3    Cholecalciferol (Vitamin D3 Super Strength) 50 MCG (2000 UT) tablet, Take 1 tablet by mouth Every Morning., Disp: 30 each, Rfl: 11    dilTIAZem CD (CARDIZEM CD) 240 MG 24 hr capsule,  Take 1 capsule by mouth Daily., Disp: 90 capsule, Rfl: 3    gabapentin (NEURONTIN) 300 MG capsule, Take 1 capsule by mouth 2 (Two) Times a Day., Disp: 60 capsule, Rfl: 2    HumuLIN R U-500 KwikPen 500 UNIT/ML solution pen-injector CONCENTRATED injection, Inject 150 Units under the skin into the appropriate area as directed 2 (Two) Times a Day., Disp: , Rfl:     Insulin Pen Needle 31G X 8 MM misc, Inject 1 Needle as directed 2 (Two) Times a Day., Disp: 90 each, Rfl: 5    lisinopril (PRINIVIL,ZESTRIL) 40 MG tablet, Take 1 tablet by mouth Daily., Disp: , Rfl:     loratadine (Claritin) 10 MG tablet, Take 1 tablet by mouth Daily., Disp: 90 tablet, Rfl: 0    metoprolol tartrate (LOPRESSOR) 25 MG tablet, Take 1 tablet by mouth 2 (Two) Times a Day., Disp: 180 tablet, Rfl: 3    venlafaxine XR (EFFEXOR-XR) 150 MG 24 hr capsule, Take 1 capsule by mouth Daily., Disp: , Rfl:

## 2024-11-22 ENCOUNTER — TELEPHONE (OUTPATIENT)
Dept: GASTROENTEROLOGY | Facility: CLINIC | Age: 74
End: 2024-11-22
Payer: MEDICARE

## 2024-11-22 NOTE — TELEPHONE ENCOUNTER
11/22/2024    Dear Dr. Solis,     Patient: Vineet Thomas   YOB: 1950        This patient is waiting to have a Colonoscopy which I will perform at [facility] on 01/15/2025.    Our records indicate this patient is currently taking Eliquis. This procedure requires the patient to suspend their anticoagulant medication prior to surgery.     Please respond to this request noting your recommendations. You may contact our office at [phone] Option 3 with any questions. I appreciate your prompt response in this matter.     Please return this form to our office no later than two weeks prior to the procedure date listed above. Please return form to [facility]. Please inform our office if the patient requires additional follow-up from your office prior to scheduled procedure date.     ____ I approve my patient to stop taking their Anticoagulant Therapy medication 2 days prior to the scheduled procedure.    ____ I do NOT approve my patient to stop taking their Anticoagulant Therapy medication at this time.  _________________________________________________________    ____ I approve my patient from a Cardiac standpoint    ____ I do NOT approve my patient from a Cardiac standpoint at this time  Please specify clearance expiration date:_____________________________    Approving physician name (please print):     _____________________________________________    Approving physician signature:     ________________________________    Date:________________    Sincerely,  [facility]   Gastroenterology - Dr.Laura Gallegos

## 2024-11-25 NOTE — TELEPHONE ENCOUNTER
Denied through [employer] - no pharmacy benefits. Spoke to [facility] they said he has [employer] for his prescription benefits. They will try to run it through to see if a PA is even needed.

## 2024-12-26 ENCOUNTER — OFFICE VISIT (OUTPATIENT)
Dept: INTERNAL MEDICINE | Age: 74
End: 2024-12-26
Payer: MEDICARE

## 2024-12-26 VITALS
HEIGHT: 69 IN | WEIGHT: 315 LBS | OXYGEN SATURATION: 97 % | DIASTOLIC BLOOD PRESSURE: 88 MMHG | HEART RATE: 107 BPM | BODY MASS INDEX: 46.65 KG/M2 | TEMPERATURE: 97.7 F | SYSTOLIC BLOOD PRESSURE: 138 MMHG

## 2024-12-26 DIAGNOSIS — Z23 NEED FOR VACCINATION: ICD-10-CM

## 2024-12-26 DIAGNOSIS — Z79.4 TYPE 2 DIABETES MELLITUS WITHOUT COMPLICATION, WITH LONG-TERM CURRENT USE OF INSULIN: ICD-10-CM

## 2024-12-26 DIAGNOSIS — E11.9 TYPE 2 DIABETES MELLITUS WITHOUT COMPLICATION, WITH LONG-TERM CURRENT USE OF INSULIN: ICD-10-CM

## 2024-12-26 DIAGNOSIS — Z79.4 TYPE 2 DIABETES MELLITUS WITH HYPOGLYCEMIA WITHOUT COMA, WITH LONG-TERM CURRENT USE OF INSULIN: ICD-10-CM

## 2024-12-26 DIAGNOSIS — E11.649 TYPE 2 DIABETES MELLITUS WITH HYPOGLYCEMIA WITHOUT COMA, WITH LONG-TERM CURRENT USE OF INSULIN: ICD-10-CM

## 2024-12-26 DIAGNOSIS — T14.8XXA SKIN ABRASION: ICD-10-CM

## 2024-12-26 DIAGNOSIS — E16.2 HYPOGLYCEMIA: Primary | ICD-10-CM

## 2024-12-26 PROCEDURE — 1160F RVW MEDS BY RX/DR IN RCRD: CPT | Performed by: NURSE PRACTITIONER

## 2024-12-26 PROCEDURE — 3079F DIAST BP 80-89 MM HG: CPT | Performed by: NURSE PRACTITIONER

## 2024-12-26 PROCEDURE — G2211 COMPLEX E/M VISIT ADD ON: HCPCS | Performed by: NURSE PRACTITIONER

## 2024-12-26 PROCEDURE — 1126F AMNT PAIN NOTED NONE PRSNT: CPT | Performed by: NURSE PRACTITIONER

## 2024-12-26 PROCEDURE — 1159F MED LIST DOCD IN RCRD: CPT | Performed by: NURSE PRACTITIONER

## 2024-12-26 PROCEDURE — 3075F SYST BP GE 130 - 139MM HG: CPT | Performed by: NURSE PRACTITIONER

## 2024-12-26 PROCEDURE — 99214 OFFICE O/P EST MOD 30 MIN: CPT | Performed by: NURSE PRACTITIONER

## 2024-12-26 NOTE — PROGRESS NOTES
Chief Complaint  Blood Sugar Problem (Pt reports his Blood glucose bottomed out Saturday and was taken to [facility] in [place]. Pt  son states after 1/3 of 2-liter of mnt dew it finally got to 40's and by the time EMS arrived it was only at 60. )    Jarod Thomas is a 74 y.o. male who presents to [facility]     History of Present Illness  The patient presents for evaluation of hypoglycemia follow-up from recent ER visit at [facility] in [place].    He experienced a significant drop in his blood glucose levels, which fell below 40.  Patient became very confused was found by family member and taken to the emergency room he was provided Mountain Dew on the way which helped improve his blood sugar.  At his last visit he was referred diabetic specialty.  He has not kept appointment as previously recommended due to having to reschedule it. His current insulin regimen includes 60 units in the morning and 30 units at night, which is a decrease in his usual dosage, as advised by the ER. This morning, his blood glucose level was recorded at 137. He reports that his blood glucose levels have been consistently high, ranging between 225 and 275. He attempts to maintain hydration through adequate water intake but admits to not strictly adhering to a diabetic diet. He recalls an incident where he experienced hypoglycemia after consuming a high-carbohydrate breakfast of biscuits and gravy, followed by a period of 5 hours without food intake.  I have explained to him how important it is to follow ADA strict diet.  I have explained to him the amount of insulin he is on he cannot go long fasting..  I have explained to him how serious hypoglycemia can be.  I have instructed him on how important it is that he see diabetic specialist get his insulin regimen and glucose under control.      Supplemental Information  He mentions that he was referred to an eye specialist, but  "his insurance did not cover the visit, resulting in an out-of-pocket expense of $ 45.  Instructed him to try [employer].    MEDICATIONS  Current:      ED records reviewed    Objective   Vital Signs:   Vitals:    12/26/24 0959   BP: 138/88   BP Location: Left arm   Patient Position: Sitting   Cuff Size: Adult   Pulse: 107   Temp: 97.7 °F (36.5 °C)   TempSrc: Skin   SpO2: 97%   Weight: (!) 152 kg (334 lb)   Height: 175.3 cm (69.02\")     Body mass index is 49.3 kg/m².    Wt Readings from Last 3 Encounters:   12/26/24 (!) 152 kg (334 lb)   11/14/24 (!) 151 kg (333 lb 12.8 oz)   10/31/24 (!) 154 kg (339 lb)     BP Readings from Last 3 Encounters:   12/26/24 138/88   11/14/24 162/81   10/31/24 150/86       Health Maintenance   Topic Date Due    COLORECTAL CANCER SCREENING  Never done    DIABETIC EYE EXAM  Never done    TDAP/TD VACCINES (1 - Tdap) Never done    ZOSTER VACCINE (1 of 2) Never done    INFLUENZA VACCINE  07/01/2024    HEMOGLOBIN A1C  03/16/2025    COVID-19 Vaccine (2 - 2024-25 season) 03/17/2025 (Originally 9/1/2024)    LIPID PANEL  09/16/2025    BMI FOLLOWUP  09/27/2025    ANNUAL WELLNESS VISIT  10/18/2025    HEPATITIS C SCREENING  Completed    Pneumococcal Vaccine 65+  Completed       Past Medical History:   Diagnosis Date    COPD (chronic obstructive pulmonary disease)     Diabetes     Hypertension      Past Surgical History:   Procedure Laterality Date    ROTATOR CUFF REPAIR Bilateral      Family History   Adopted: Yes   Problem Relation Age of Onset    Skin cancer Daughter      Social History     Socioeconomic History    Marital status:    Tobacco Use    Smoking status: Never     Passive exposure: Never    Smokeless tobacco: Never   Vaping Use    Vaping status: Never Used   Substance and Sexual Activity    Alcohol use: Never    Drug use: Never    Sexual activity: Defer       Current Outpatient Medications   Medication Instructions    apixaban (ELIQUIS) 5 mg, Oral, Every 12 Hours Scheduled    " dilTIAZem CD (CARDIZEM CD) 240 mg, Oral, Daily    gabapentin (NEURONTIN) 300 mg, Oral, 2 Times Daily    Gvoke HypoPen 2-Pack 1 mg, Subcutaneous, Once As Needed    HumuLIN R U-500 KwikPen 150 Units, 2 Times Daily    Insulin Pen Needle 31G X 8 MM misc 1 Needle, Injection, 2 Times Daily    lisinopril (PRINIVIL,ZESTRIL) 40 MG tablet 1 tablet, Daily    metoprolol tartrate (LOPRESSOR) 25 mg, Oral, 2 Times Daily    mupirocin (BACTROBAN) 2 % ointment 1 Application, Topical, 3 Times Daily, Apply to affected area.    PEG-KCl-NaCl-NaSulf-Na Asc-C (MoviPrep) 100 g reconstituted solution powder Take as directed by your Gastroenterologist.    venlafaxine XR (EFFEXOR-XR) 150 mg, Daily    Vitamin D3 Super Strength 50 mcg, Oral, Every Early Morning       There are no discontinued medications.     Physical Exam  Constitutional:       Appearance: He is obese.   HENT:      Head: Normocephalic.   Eyes:      Pupils: Pupils are equal, round, and reactive to light.   Cardiovascular:      Rate and Rhythm: Normal rate and regular rhythm.   Pulmonary:      Effort: Pulmonary effort is normal.      Breath sounds: Normal breath sounds.   Abdominal:      Palpations: Abdomen is soft.   Neurological:      Mental Status: He is oriented to person, place, and time.   Psychiatric:         Mood and Affect: Mood normal.          Physical Exam  Heart was examined.       Result Review :  The following data was reviewed by: ALEENA Urbina on 12/26/2024:    Labs  No visits with results within 2 Month(s) from this visit.   Latest known visit with results is:   Office Visit on 09/27/2024   Component Date Value Ref Range Status    PSA 09/27/2024 0.061  0.000 - 4.000 ng/mL Final    Hepatitis C Ab 09/27/2024 Non-Reactive  Non-Reactive Final    25 Hydroxy, Vitamin D 09/27/2024 7.5 (L)  30.0 - 100.0 ng/ml Final       Imaging  No Images in the past 120 days found..    Results  Laboratory Studies  Blood sugar was lower than 40. Blood sugar was 137 in the  morning.       Procedures       ASSESSMENT & PLAN  Diagnoses and all orders for this visit:    1. Hypoglycemia (Primary)  -     Glucagon (Gvoke HypoPen 2-Pack) 1 MG/0.2ML solution auto-injector; Inject 1 mg under the skin into the appropriate area as directed 1 (One) Time As Needed (prn hypoglycemia) for up to 1 dose.  Dispense: 0.4 mL; Refill: 2    2. Need for vaccination  -     Cancel: Fluzone High-Dose 65+yrs ([id number])    3. Skin abrasion  -     mupirocin (BACTROBAN) 2 % ointment; Apply 1 Application topically to the appropriate area as directed 3 (Three) Times a Day. Apply to affected area.  Dispense: 22 g; Refill: 1    4. Type 2 diabetes mellitus without complication, with long-term current use of insulin    5. Type 2 diabetes mellitus with hypoglycemia without coma, with long-term current use of insulin  -     Glucagon (Gvoke HypoPen 2-Pack) 1 MG/0.2ML solution auto-injector; Inject 1 mg under the skin into the appropriate area as directed 1 (One) Time As Needed (prn hypoglycemia) for up to 1 dose.  Dispense: 0.4 mL; Refill: 2         Assessment & Plan  1. Hypoglycemia.  His hypoglycemic episodes are likely due to a combination of high insulin dosage and inadequate dietary management. The potential risks associated with hypoglycemia, including the possibility of falls, head injuries, loss of consciousness, stroke, and heart attack, were discussed. It was explained that his recent hypoglycemic episode was likely due to a high-carbohydrate breakfast followed by a period of fasting. He was advised to adhere to a diabetic diet, limiting his intake of carbohydrates such as potatoes, pasta, and bread, and avoiding simple sugars, soda, cakes, and cookies.  He admits to eating all of these.  He was also advised to consume three diabetic meals and two high-protein snacks daily, ensuring no long periods without food intake.  He is encouraged to check his glucose 3 times a day and as needed.  He was encouraged to the  hypoglucose pen with him at all times and instructed family on how to use.  He was instructed to monitor glucose and maintain a log for review in 2 weeks. A prescription for a glucagon pen was provided for use during hypoglycemic episodes. He was advised to reduce his insulin dosage to 75 units as per the ER's recommendation. He was instructed to reschedule his appointment with the diabetes specialist.    2. Keratosis to left elbow with superficial abrasion  He was advised to avoid manipulating the lesion and to clean it with soap and water. A prescription for bacitracin ointment was provided for topical application. A referral to dermatology will be made if the condition does not improve.    Follow-up  The patient will follow up in 2 weeks.                  FOLLOW UP  Return in about 2 weeks (around 1/9/2025).  Patient was given instructions and counseling regarding his condition or for health maintenance advice. Please see specific information pulled into the AVS if appropriate.     Patient or patient representative verbalized consent for the use of Ambient Listening during the visit with  ALEENA Urbina for chart documentation. 12/29/2024  10:19 EST    ALEENA Urbina  12/29/24  10:20 EST

## 2024-12-26 NOTE — PATIENT INSTRUCTIONS
See diabetic care at [facility] as instructed  Keep log glu x 2 weeks and bring in log  Check glucose 3 times daily and as needed  Must follow strict ADA diet consuming 3 meals a day with 2 high-protein snacks  Carry glucose pen with him at all times  Instruct family on how to use pen

## 2024-12-29 RX ORDER — MUPIROCIN 20 MG/G
1 OINTMENT TOPICAL 3 TIMES DAILY
Qty: 22 G | Refills: 1 | Status: SHIPPED | OUTPATIENT
Start: 2024-12-29

## 2024-12-29 RX ORDER — GLUCAGON INJECTION, SOLUTION 1 MG/.2ML
1 INJECTION, SOLUTION SUBCUTANEOUS ONCE AS NEEDED
Qty: 0.4 ML | Refills: 2 | Status: SHIPPED | OUTPATIENT
Start: 2024-12-29

## 2024-12-30 ENCOUNTER — TELEPHONE (OUTPATIENT)
Dept: INTERNAL MEDICINE | Age: 74
End: 2024-12-30

## 2024-12-30 NOTE — TELEPHONE ENCOUNTER
Caller: Vineet Thomas    Relationship: Self    Best call back number: [phone]    What is the best time to reach you: ANYTIME    Who are you requesting to speak with (clinical staff, provider,  specific staff member): NURSE     What was the call regarding: PATIENT IS CALLING REGARDING AN APPOINTMENT WITH DIABETIC DOCTOR.  PATIENT STATES THAT HE WAS TO HAVE A REFERRAL FOR ONE, WANTED TO GET NUMBER OF WHO IT WILL BE WITH.

## 2024-12-31 ENCOUNTER — TELEPHONE (OUTPATIENT)
Dept: GASTROENTEROLOGY | Facility: CLINIC | Age: 74
End: 2024-12-31
Payer: MEDICARE

## 2024-12-31 NOTE — TELEPHONE ENCOUNTER
Patient needing cardiac clearance for colonoscopy 01/15/2025.   Per Dr. Garcia office patient needs to complete echo prior, patient was scheduled 12/03/2024 but no showed   I spoke with patient, he is going to reach out and R./S echo,   Patient aware if unable to obtain clearance before 01/15/2025, colonoscopy will need to be R/S

## 2025-01-02 DIAGNOSIS — Z79.4 TYPE 2 DIABETES MELLITUS WITHOUT COMPLICATION, WITH LONG-TERM CURRENT USE OF INSULIN: ICD-10-CM

## 2025-01-02 DIAGNOSIS — E11.9 TYPE 2 DIABETES MELLITUS WITHOUT COMPLICATION, WITH LONG-TERM CURRENT USE OF INSULIN: ICD-10-CM

## 2025-01-02 DIAGNOSIS — Z79.899 MEDICATION MANAGEMENT: Primary | ICD-10-CM

## 2025-01-02 NOTE — TELEPHONE ENCOUNTER
Caller: Vineet Thomas    Relationship: Self    Best call back number: [phone]    Requested Prescriptions:   Requested Prescriptions     Pending Prescriptions Disp Refills    gabapentin (NEURONTIN) 300 MG capsule 60 capsule 2     Sig: Take 1 capsule by mouth 2 (Two) Times a Day.        Pharmacy where request should be sent: [id number] [place], [place] - [address] [facility] [phone] [facility] [phone] FX     Last office visit with prescribing clinician: 12/26/2024   Last telemedicine visit with prescribing clinician: Visit date not found   Next office visit with prescribing clinician: 1/21/2025     Additional details provided by patient:     Does the patient have less than a 3 day supply:  [x] Yes  [] No        Tianna Martinez Rep   01/02/25 10:13 EST

## 2025-01-03 DIAGNOSIS — E11.9 TYPE 2 DIABETES MELLITUS WITHOUT COMPLICATION, WITH LONG-TERM CURRENT USE OF INSULIN: Primary | ICD-10-CM

## 2025-01-03 DIAGNOSIS — Z79.4 TYPE 2 DIABETES MELLITUS WITHOUT COMPLICATION, WITH LONG-TERM CURRENT USE OF INSULIN: Primary | ICD-10-CM

## 2025-01-03 RX ORDER — GABAPENTIN 300 MG/1
300 CAPSULE ORAL 2 TIMES DAILY
Qty: 90 CAPSULE | Refills: 3 | Status: SHIPPED | OUTPATIENT
Start: 2025-01-03

## 2025-01-14 NOTE — PROGRESS NOTES
Chief Complaint  Establish Care (Pt is a 74 yr old male presenting to Diabetes Care to establish care for diabetes management./)    Referred By: BUBBA Urbina*    Subjective          Patient or patient representative verbalized consent for the use of Ambient Listening during the visit with  ALEENA Dodson for chart documentation. 1/16/2025  15:07 ASHWINI Thomas presents to [facility] for diabetes medication management    History of Present Illness  The patient is a 74-year-old male referred by his primary care provider for management of diabetes.    He has been living with diabetes for approximately 30 years, diagnosed at the age of 42. He reports highly variable blood glucose levels, ranging from 100 to 302, with occasional lows of 110 and 178. He experiences frequent urination but does not report excessive thirst or hunger. He has been adhering to a low-carb diet, consuming 60 carbs per meal, which has resulted in weight loss. He has lost almost 8 pounds since his last office visit. He has not consulted an ophthalmologist and reports persistent fatigue. He occasionally experiences slow wound healing. He has not been diagnosed with gastroparesis or pancreatitis and does not report chronic constipation, diarrhea, reflux, indigestion, or severe heartburn. He has not experienced erectile dysfunction. He has previously used a Freestyle Joshua 2 CGM but misplaced it during a move from [place]. He has also used a Medtronic insulin pump but discontinued its use 4 years ago due to inconvenience. He has not tried Trulicity, Ozempic, or Mounjaro. He is open to trying Ozempic and has a 3-month supply of U-500 insulin. He has been under the care of Dr. Lovelace, a podiatrist. He has previously used Humalog insulin. He has been asked about Ozempic, but he is not worried about weight loss. He is not opposed to trying Ozempic. He has about 3 months of U-500 insulin. He has never  been able to urinate. His current medication regimen includes U-500 insulin, administered at a reduced dose of 110 units twice daily, down from the initial 150 units, as advised by his healthcare provider following a hypoglycemic episode. He was supposed to take this 3 times a day, but he is taking it only twice a day. He is also on gabapentin for neuropathy. He has a history of rapid-acting insulin via Medtronic insulin pump. He was hospitalized on 12/22/2024 for severe hypoglycemia.    He reports neuropathy in his feet and hands, characterized by intermittent numbness, tingling, and shooting pain. He has not undergone any amputations.    Supplemental Information  He is going to see a cardiologist and needs to get an echocardiogram that he has not done yet. He has a history of rapid-acting insulin via Medtronic insulin pump. He was hospitalized on 12/22/2024 for severe hypoglycemia. He has not been on a pump for probably 4 years. He has not been on any other insulins like Lantus or Levemir. He has a [employer] insurance.    SOCIAL HISTORY  He lives with his son.    FAMILY HISTORY  He is adopted, so he does not know his family history.    MEDICATIONS  Current: Gabapentin, U-500 insulin.  Past: Humalog.      History of Present Illness    Visit type:  to establish care  Diabetes type:  Type 2  Age at time of dx/Year of dx/Number of years: Approximately 30 years  Family History of Diabetes: Adopted-unknown family history  Current diabetes status/concerns/issues: Highly variable blood glucose levels  Other current health concerns: None  Current Diabetes symptoms:    Polyuria: Yes with elevated blood glucose levels   Polydipsia: No   Polyphagia: No   Blurred vision: Yes     Excessive fatigue: Yes    Known Diabetes complications:  Neuropathy: Numbness, Tingling, Burning, and Shooting Pain; Location: Feet, Hands, and Bilateral  Renal: No current eGFR available and No current urine microalbumin available  Eyes: No  current eye exam available in record; Location: N/A; Last Eye Exam:  2022 ; Location: [place]  Amputation/Wounds:  Slow to heal  GI: None  Cardiovascular: Hypertension and Hyperlipidemia  ED: None  Other: None  Hospitalizations/ED/911 secondary to DM?  Yes, December 22, 2024 for severe hypoglycemia  Hypoglycemia:  None reported at this time  Hypoglycemia Symptoms:  No hypoglycemia at this time  Current Diabetes treatment:  Humulin R U-500 110 units twice daily  Prior diabetes treatments: Rapid acting insulin via insulin pump  Using ACEI or ARB: Yes, lisinopril 40 mg daily, Managed by other provider  Using Statin: No  Blood glucose device:  Meter  Blood glucose monitoring frequency:  2 -3  Blood glucose range/average:   200 over 400 mg/dL  Glucose Source: BG Logs  Dietary behavior:  Limits high carb/sweet foods, Avoids sugary drinks, Number of meals each day - 3; Number of snacks each day - occasional  Activity/Exercise:  None  Last Foot Exam:  Current with Dr. Lovelace  Diabetes Education Hx: Comprehensive Diabetes Education and Diabetes Nutrition Counseling  Social Determinants of Health: None    Past Medical History:   Diagnosis Date    COPD (chronic obstructive pulmonary disease)     Diabetes     Hypertension      Past Surgical History:   Procedure Laterality Date    ROTATOR CUFF REPAIR Bilateral      Family History   Adopted: Yes   Problem Relation Age of Onset    Skin cancer Daughter      Social History     Socioeconomic History    Marital status:    Tobacco Use    Smoking status: Never     Passive exposure: Never    Smokeless tobacco: Never   Vaping Use    Vaping status: Never Used   Substance and Sexual Activity    Alcohol use: Never    Drug use: Never    Sexual activity: Defer     No Known Allergies    Current Outpatient Medications:     apixaban (Eliquis) 5 MG tablet tablet, Take 1 tablet by mouth Every 12 (Twelve) Hours., Disp: 180 tablet, Rfl: 3    Cholecalciferol (Vitamin D3 Super Strength) 50 MCG  "(2000 UT) tablet, Take 1 tablet by mouth Every Morning., Disp: 30 each, Rfl: 11    dilTIAZem CD (CARDIZEM CD) 240 MG 24 hr capsule, Take 1 capsule by mouth Daily., Disp: 90 capsule, Rfl: 3    gabapentin (NEURONTIN) 300 MG capsule, Take 1 capsule by mouth 2 (Two) Times a Day., Disp: 90 capsule, Rfl: 3    Glucagon (Gvoke HypoPen 2-Pack) 1 MG/0.2ML solution auto-injector, Inject 1 mg under the skin into the appropriate area as directed 1 (One) Time As Needed (prn hypoglycemia) for up to 1 dose., Disp: 0.4 mL, Rfl: 2    HumuLIN R U-500 KwikPen 500 UNIT/ML solution pen-injector CONCENTRATED injection, Inject 110 Units under the skin into the appropriate area as directed 2 (Two) Times a Day., Disp: , Rfl:     Insulin Pen Needle 31G X 8 MM misc, Inject 1 Needle as directed 2 (Two) Times a Day., Disp: 90 each, Rfl: 5    lisinopril (PRINIVIL,ZESTRIL) 40 MG tablet, Take 1 tablet by mouth Daily., Disp: , Rfl:     metoprolol tartrate (LOPRESSOR) 25 MG tablet, Take 1 tablet by mouth 2 (Two) Times a Day., Disp: 180 tablet, Rfl: 3    mupirocin (BACTROBAN) 2 % ointment, Apply 1 Application topically to the appropriate area as directed 3 (Three) Times a Day. Apply to affected area., Disp: 22 g, Rfl: 1    Semaglutide,0.25 or 0.5MG/DOS, (Ozempic, 0.25 or 0.5 MG/DOSE,) 2 MG/3ML solution pen-injector, Inject 0.5 mg under the skin into the appropriate area as directed 1 (One) Time Per Week., Disp: 3 mL, Rfl: 1    Semaglutide,0.25 or 0.5MG/DOS, (Ozempic, 0.25 or 0.5 MG/DOSE,) 2 MG/3ML solution pen-injector, Inject 0.25 mg under the skin into the appropriate area as directed 1 (One) Time Per Week., Disp: 3 mL, Rfl: 0    Objective     Vitals:    01/15/25 1433   BP: 150/76   BP Location: Left arm   Patient Position: Sitting   Cuff Size: Large Adult   Pulse: 73   SpO2: 91%   Weight: (!) 149 kg (328 lb 12.8 oz)   Height: 175.3 cm (69.02\")     Body mass index is 48.53 kg/m².    Physical Exam  Constitutional:       Appearance: Normal appearance. " He is obese.      Comments: Severe Obesity (BMI >= 40) Pt Current BMI = 48.53      HENT:      Head: Normocephalic and atraumatic.      Right Ear: External ear normal.      Left Ear: External ear normal.      Nose: Nose normal.   Eyes:      Extraocular Movements: Extraocular movements intact.      Conjunctiva/sclera: Conjunctivae normal.   Pulmonary:      Effort: Pulmonary effort is normal.   Musculoskeletal:         General: Normal range of motion.      Cervical back: Normal range of motion.   Skin:     General: Skin is warm and dry.   Neurological:      General: No focal deficit present.      Mental Status: He is alert and oriented to person, place, and time. Mental status is at baseline.   Psychiatric:         Mood and Affect: Mood normal.         Behavior: Behavior normal.         Thought Content: Thought content normal.         Judgment: Judgment normal.         Result Review :   The following data was reviewed by: ALEENA Dodson on 01/15/2025:    Most Recent A1C          1/15/2025    14:49   HGBA1C Most Recent   Hemoglobin A1C 7.6        A1C Last 3 Results          9/16/2024    00:00 1/15/2025    14:49   HGBA1C Last 3 Results   Hemoglobin A1C 6.90     7.6       Details          This result is from an external source.             A1c collected in the office today is 7.6%, indicating Uncontrolled Type II diabetes.  This result is up from the prior result of 6.9% collected on 9/16/2024    Glucose   Date Value Ref Range Status   01/15/2025 289 (H) 70 - 99 mg/dL Final     Comment:     Serial Number: [id number]:  [id number]     Point of care glucose in the office today is  elevated at 289 mg/dL    HDL Cholesterol   Date Value Ref Range Status   09/16/2024 43 >=40 mg/dL Final     LDL Cholesterol    Date Value Ref Range Status   09/16/2024 120.8 <=130.0 mg/dL Final     Comment:     Desirable range <100 mg/dL for patients with CHD or diabetes and <70   mg/dL for diabetic patients with known heart disease.      Lipid panel collected on 9/16/2024 shows Hyperlipidemia              Diagnoses and all orders for this visit:    1. Type 2 diabetes mellitus with hyperglycemia, with long-term current use of insulin (Primary)  -     Microalbumin / Creatinine Urine Ratio - Urine, Clean Catch; Future  -     POC Glycosylated Hemoglobin (Hb A1C)  -     Semaglutide,0.25 or 0.5MG/DOS, (Ozempic, 0.25 or 0.5 MG/DOSE,) 2 MG/3ML solution pen-injector; Inject 0.5 mg under the skin into the appropriate area as directed 1 (One) Time Per Week.  Dispense: 3 mL; Refill: 1  -     Microalbumin / Creatinine Urine Ratio - Urine, Clean Catch  -     Semaglutide,0.25 or 0.5MG/DOS, (Ozempic, 0.25 or 0.5 MG/DOSE,) 2 MG/3ML solution pen-injector; Inject 0.25 mg under the skin into the appropriate area as directed 1 (One) Time Per Week.  Dispense: 3 mL; Refill: 0    2. Primary hypertension    3. Class 3 severe obesity due to excess calories with serious comorbidity and body mass index (BMI) of 45.0 to 49.9 in adult    Other orders  -     POC Glucose        Assessment & Plan  1. Diabetes mellitus.  His blood glucose levels have been significantly elevated, with a reading of 289 mg/dL in the office today. His A1c has also increased from 6.9% on 09/16/2024 to 7.6% today. He is currently taking U-500 insulin at 110 units twice a day. The variability in his blood glucose levels is concerning, and he experiences frequent lows and highs. He has lost almost 8 pounds since his last office visit. He will continue with the U-500 insulin regimen. Additionally, he will be initiated on Ozempic, starting with a dose of 0.25 mg once weekly for 4 weeks, followed by an increase to 0.5 mg for the subsequent 2 weeks. A sample pen of Ozempic will be provided for home use. He will also be provided with a FreeStyle Joshua 3 sensor through [facility] to monitor his blood glucose levels continuously. A urine test will be conducted to assess his renal function.    2. Neuropathy.  He  reports neuropathy in his feet and recently in his hands, characterized by numbness, tingling, and shooting pain. He is currently taking gabapentin for this condition.    Follow-up  The patient will follow up in 1 month to evaluate his tolerance to the new medication regimen.    The patient will monitor his blood glucose levels 2 -3 times daily.  If he develops problematic hyperglycemia or hypoglycemia or adverse drug reactions, he will contact the office for further instructions.        Follow Up     Return in about 4 weeks (around 2/12/2025) for CGM Follow-Up, Medication Management.    Patient was given instructions and counseling regarding his condition or for health maintenance advice. Please see specific information pulled into the AVS if appropriate.     Salvador Huggins, APRCHARISSE  01/15/2025    Dictated Utilizing Dragon Dictation.  Please note that portions of this note were completed with a voice recognition program.  Part of this note may be an electronic transcription/translation of spoken language to printed text using the Dragon Dictation System.

## 2025-01-15 ENCOUNTER — OFFICE VISIT (OUTPATIENT)
Dept: DIABETES SERVICES | Facility: HOSPITAL | Age: 75
End: 2025-01-15
Payer: MEDICARE

## 2025-01-15 VITALS
WEIGHT: 315 LBS | DIASTOLIC BLOOD PRESSURE: 76 MMHG | HEIGHT: 69 IN | HEART RATE: 73 BPM | OXYGEN SATURATION: 91 % | SYSTOLIC BLOOD PRESSURE: 150 MMHG | BODY MASS INDEX: 46.65 KG/M2

## 2025-01-15 DIAGNOSIS — E66.813 CLASS 3 SEVERE OBESITY DUE TO EXCESS CALORIES WITH SERIOUS COMORBIDITY AND BODY MASS INDEX (BMI) OF 45.0 TO 49.9 IN ADULT: ICD-10-CM

## 2025-01-15 DIAGNOSIS — E66.01 CLASS 3 SEVERE OBESITY DUE TO EXCESS CALORIES WITH SERIOUS COMORBIDITY AND BODY MASS INDEX (BMI) OF 45.0 TO 49.9 IN ADULT: ICD-10-CM

## 2025-01-15 DIAGNOSIS — E11.65 TYPE 2 DIABETES MELLITUS WITH HYPERGLYCEMIA, WITH LONG-TERM CURRENT USE OF INSULIN: Primary | ICD-10-CM

## 2025-01-15 DIAGNOSIS — I10 PRIMARY HYPERTENSION: ICD-10-CM

## 2025-01-15 DIAGNOSIS — Z79.4 TYPE 2 DIABETES MELLITUS WITH HYPERGLYCEMIA, WITH LONG-TERM CURRENT USE OF INSULIN: Primary | ICD-10-CM

## 2025-01-15 LAB
ALBUMIN UR-MCNC: 23.5 MG/DL
CREAT UR-MCNC: 112.4 MG/DL
EXPIRATION DATE: ABNORMAL
GLUCOSE BLDC GLUCOMTR-MCNC: 289 MG/DL (ref 70–99)
HBA1C MFR BLD: 7.6 % (ref 4.5–5.7)
Lab: ABNORMAL
MICROALBUMIN/CREAT UR: 209.1 MG/G (ref 0–29)

## 2025-01-15 PROCEDURE — 3051F HG A1C>EQUAL 7.0%<8.0%: CPT

## 2025-01-15 PROCEDURE — 1159F MED LIST DOCD IN RCRD: CPT

## 2025-01-15 PROCEDURE — 99214 OFFICE O/P EST MOD 30 MIN: CPT

## 2025-01-15 PROCEDURE — 3077F SYST BP >= 140 MM HG: CPT

## 2025-01-15 PROCEDURE — 3078F DIAST BP <80 MM HG: CPT

## 2025-01-15 PROCEDURE — 1160F RVW MEDS BY RX/DR IN RCRD: CPT

## 2025-01-15 PROCEDURE — 83036 HEMOGLOBIN GLYCOSYLATED A1C: CPT

## 2025-01-15 PROCEDURE — 82948 REAGENT STRIP/BLOOD GLUCOSE: CPT

## 2025-01-15 PROCEDURE — 82043 UR ALBUMIN QUANTITATIVE: CPT

## 2025-01-15 PROCEDURE — 82570 ASSAY OF URINE CREATININE: CPT

## 2025-01-15 RX ORDER — SEMAGLUTIDE 0.68 MG/ML
0.5 INJECTION, SOLUTION SUBCUTANEOUS WEEKLY
Qty: 3 ML | Refills: 1 | Status: SHIPPED | OUTPATIENT
Start: 2025-01-15

## 2025-01-15 NOTE — PATIENT INSTRUCTIONS
Ozempic:    Inject 0.25 mg once weekly for 4 weeks.  After completing 4 weeks at the 0.25 mg dose, you will increase to 0.5 mg for 2 weeks.

## 2025-01-16 RX ORDER — SEMAGLUTIDE 0.68 MG/ML
0.25 INJECTION, SOLUTION SUBCUTANEOUS WEEKLY
Qty: 3 ML | Refills: 0 | COMMUNITY
Start: 2025-01-16

## 2025-01-21 ENCOUNTER — TELEPHONE (OUTPATIENT)
Dept: DIABETES SERVICES | Facility: HOSPITAL | Age: 75
End: 2025-01-21
Payer: MEDICARE

## 2025-01-21 ENCOUNTER — OFFICE VISIT (OUTPATIENT)
Dept: INTERNAL MEDICINE | Age: 75
End: 2025-01-21
Payer: MEDICARE

## 2025-01-21 VITALS
BODY MASS INDEX: 46.65 KG/M2 | OXYGEN SATURATION: 91 % | DIASTOLIC BLOOD PRESSURE: 96 MMHG | HEIGHT: 69 IN | WEIGHT: 315 LBS | SYSTOLIC BLOOD PRESSURE: 164 MMHG | HEART RATE: 105 BPM | TEMPERATURE: 97.3 F

## 2025-01-21 DIAGNOSIS — Z79.899 MEDICATION MANAGEMENT: ICD-10-CM

## 2025-01-21 DIAGNOSIS — G47.33 OBSTRUCTIVE SLEEP APNEA SYNDROME: ICD-10-CM

## 2025-01-21 DIAGNOSIS — Z79.4 TYPE 2 DIABETES MELLITUS WITHOUT COMPLICATION, WITH LONG-TERM CURRENT USE OF INSULIN: Primary | ICD-10-CM

## 2025-01-21 DIAGNOSIS — I10 ESSENTIAL HYPERTENSION: ICD-10-CM

## 2025-01-21 DIAGNOSIS — E11.9 TYPE 2 DIABETES MELLITUS WITHOUT COMPLICATION, WITH LONG-TERM CURRENT USE OF INSULIN: Primary | ICD-10-CM

## 2025-01-21 DIAGNOSIS — E78.2 MIXED HYPERLIPIDEMIA: ICD-10-CM

## 2025-01-21 DIAGNOSIS — E06.3 HYPOTHYROIDISM DUE TO HASHIMOTO'S THYROIDITIS: ICD-10-CM

## 2025-01-21 PROCEDURE — 1159F MED LIST DOCD IN RCRD: CPT | Performed by: NURSE PRACTITIONER

## 2025-01-21 PROCEDURE — 3077F SYST BP >= 140 MM HG: CPT | Performed by: NURSE PRACTITIONER

## 2025-01-21 PROCEDURE — G2211 COMPLEX E/M VISIT ADD ON: HCPCS | Performed by: NURSE PRACTITIONER

## 2025-01-21 PROCEDURE — 1160F RVW MEDS BY RX/DR IN RCRD: CPT | Performed by: NURSE PRACTITIONER

## 2025-01-21 PROCEDURE — 3080F DIAST BP >= 90 MM HG: CPT | Performed by: NURSE PRACTITIONER

## 2025-01-21 PROCEDURE — 1126F AMNT PAIN NOTED NONE PRSNT: CPT | Performed by: NURSE PRACTITIONER

## 2025-01-21 PROCEDURE — 99214 OFFICE O/P EST MOD 30 MIN: CPT | Performed by: NURSE PRACTITIONER

## 2025-01-21 PROCEDURE — 3051F HG A1C>EQUAL 7.0%<8.0%: CPT | Performed by: NURSE PRACTITIONER

## 2025-01-21 RX ORDER — METOPROLOL TARTRATE 25 MG/1
TABLET, FILM COATED ORAL
Qty: 270 TABLET | Refills: 3 | Status: SHIPPED | OUTPATIENT
Start: 2025-01-21

## 2025-01-21 RX ORDER — POLYETHYLENE GLYCOL 3350, SODIUM CHLORIDE, SODIUM BICARBONATE, POTASSIUM CHLORIDE 420; 11.2; 5.72; 1.48 G/4L; G/4L; G/4L; G/4L
POWDER, FOR SOLUTION ORAL
COMMUNITY
Start: 2024-11-25

## 2025-01-21 NOTE — PROGRESS NOTES
Chief Complaint  Follow-up    Subjective      Vineet Thomas is a 74 y.o. male who presents to [facility]     History of Present Illness  The patient presents for evaluation of diabetes mellitus, hypertension, and health maintenance.    He reports that his blood glucose levels have been fluctuating between 210 and 430, with the higher readings typically occurring postprandially or after consuming sweets.  Instructed him on how important it is to avoid simple sugars and reduce his carbohydrates.  He has been on Ozempic 0.25 mg once weekly for the past 4 weeks, which he tolerates well without experiencing any nausea, vomiting, or constipation. He is trying to do better with his carbohydrate intake, his goal is to consuming approximately 60 grams of carbohydrates per meal. He recently consulted with a diabetic nurse practitioner Salvador Huggins.  He lost his [employer] 3 reader and is unable to get a replacement so he has been checking his glucose manually.  He has not experienced any episodes of hypoglycemia.  He is also on U-500 insulin as well as the Ozempic.  No hypoglycemic episodes.  Continue with diabetic nurse practitioner as planned    Hypertension blood pressure remains elevated despite taking his metoprolol Cardizem as instructed.  He is currently on metoprolol tartrate 25 mg, taking one tablet twice daily.  I have explained to him his blood pressure continues to be elevated despite taking all of his medications he is willing to increase his Lopressor to 50 mg in the morning 25 at night heart rate currently 105 and regular      He has an upcoming appointment with Dr. Gallegos, a gastroenterologist, for a colonoscopy. However, he has been informed that the procedure can not be performed until he undergoes an echocardiogram.  As patient reports per instructions from cardiologist Dr. Solis.  he missed his previously scheduled echocardiogram and is awaiting a call to reschedule it.   "Will give the number of the cardiovascular department at the hospital to try to expedite that appointment.    MEDICATIONS  IMMUNIZATIONS  He is uncertain about the status of his influenza vaccination.         Objective   Vital Signs:   Vitals:    01/21/25 1317   BP: 164/96   Pulse: 105   Temp: 97.3 °F (36.3 °C)   SpO2: 91%   Weight: (!) 150 kg (330 lb 9.6 oz)   Height: 175.3 cm (69\")     Body mass index is 48.82 kg/m².    Wt Readings from Last 3 Encounters:   01/21/25 (!) 150 kg (330 lb 9.6 oz)   01/15/25 (!) 149 kg (328 lb 12.8 oz)   12/26/24 (!) 152 kg (334 lb)     BP Readings from Last 3 Encounters:   01/21/25 164/96   01/15/25 150/76   12/26/24 138/88       Health Maintenance   Topic Date Due    COLORECTAL CANCER SCREENING  Never done    TDAP/TD VACCINES (1 - Tdap) Never done    ZOSTER VACCINE (1 of 2) Never done    INFLUENZA VACCINE  07/01/2024    DIABETIC EYE EXAM  03/05[birth year]2025 (Originally 2/7/1960)    COVID-19 Vaccine (2 - 2024-25 season) 03/17/2025 (Originally 9/1/2024)    HEMOGLOBIN A1C  07/15/2025    LIPID PANEL  09/16/2025    ANNUAL WELLNESS VISIT  10/18/2025    BMI FOLLOWUP  01/16/2026    HEPATITIS C SCREENING  Completed    Pneumococcal Vaccine 65+  Completed    URINE MICROALBUMIN  Discontinued       Past Medical History:   Diagnosis Date    COPD (chronic obstructive pulmonary disease)     Diabetes     Hypertension      Past Surgical History:   Procedure Laterality Date    ROTATOR CUFF REPAIR Bilateral      Family History   Adopted: Yes   Problem Relation Age of Onset    Skin cancer Daughter      Social History     Socioeconomic History    Marital status:    Tobacco Use    Smoking status: Never     Passive exposure: Never    Smokeless tobacco: Never   Vaping Use    Vaping status: Never Used   Substance and Sexual Activity    Alcohol use: Never    Drug use: Never    Sexual activity: Defer       Current Outpatient Medications   Medication Instructions    apixaban (ELIQUIS) 5 mg, Oral, Every 12 Hours " Scheduled    dilTIAZem CD (CARDIZEM CD) 240 mg, Oral, Daily    gabapentin (NEURONTIN) 300 mg, Oral, 2 Times Daily    Gvoke HypoPen 2-Pack 1 mg, Subcutaneous, Once As Needed    HumuLIN R U-500 KwikPen 110 Units, 2 Times Daily    Insulin Pen Needle 31G X 8 MM misc 1 Needle, Injection, 2 Times Daily    lisinopril (PRINIVIL,ZESTRIL) 40 MG tablet 1 tablet, Daily    metoprolol tartrate (LOPRESSOR) 25 MG tablet 2 tablets every am and one every pm    mupirocin (BACTROBAN) 2 % ointment 1 Application, Topical, 3 Times Daily, Apply to affected area.    Ozempic (0.25 or 0.5 MG/DOSE) 0.5 mg, Subcutaneous, Weekly    Ozempic (0.25 or 0.5 MG/DOSE) 0.25 mg, Subcutaneous, Weekly    polyethylene glycol-electrolytes (NULYTELY) 420 g solution MIX AND DRINK AS DIRECTED    Vitamin D3 Super Strength 50 mcg, Oral, Every Early Morning       Medications Discontinued During This Encounter   Medication Reason    metoprolol tartrate (LOPRESSOR) 25 MG tablet         Physical Exam  Vitals and nursing note reviewed.   Constitutional:       Appearance: He is obese.   HENT:      Head: Normocephalic.   Eyes:      Extraocular Movements: Extraocular movements intact.      Pupils: Pupils are equal, round, and reactive to light.   Cardiovascular:      Rate and Rhythm: Normal rate and regular rhythm.      Pulses: Normal pulses.   Pulmonary:      Effort: Pulmonary effort is normal.      Breath sounds: Normal breath sounds.   Abdominal:      Palpations: Abdomen is soft.   Musculoskeletal:         General: Normal range of motion.      Cervical back: Normal range of motion.   Skin:     General: Skin is warm and dry.   Neurological:      General: No focal deficit present.      Mental Status: He is alert.   Psychiatric:         Mood and Affect: Mood normal.         Behavior: Behavior normal.         Thought Content: Thought content normal.          Physical Exam  Vital Signs  Blood pressure was 164/96 at last visit.       Result Review :  The following data was  reviewed by: ALEENA Urbina on 01/21/2025:    Labs  Office Visit on 01/15/2025   Component Date Value Ref Range Status    Glucose 01/15/2025 289 (H)  70 - 99 mg/dL Final    Serial Number: [id number]:  [id number]    Hemoglobin A1C 01/15/2025 7.6 (A)  4.5 - 5.7 % Final    Lot Number 01/15/2025 [id number]   Final    Expiration Date 01/15/2025 10-   Final    Microalbumin/Creatinine Ratio 01/15/2025 209.1 (H)  0.0 - 29.0 mg/g Final    Creatinine, Urine 01/15/2025 112.4  mg/dL Final    Microalbumin, Urine 01/15/2025 23.5  mg/dL Final       Imaging  No Images in the past 120 days found..    Results  Laboratory Studies  Average blood sugar between 210-430.       Procedures       ASSESSMENT & PLAN  Diagnoses and all orders for this visit:    1. Type 2 diabetes mellitus without complication, with long-term current use of insulin (Primary)    2. Medication management    3. Hypothyroidism due to Hashimoto's thyroiditis    4. Mixed hyperlipidemia    5. Essential hypertension    6. Obstructive sleep apnea syndrome    Other orders  -     metoprolol tartrate (LOPRESSOR) 25 MG tablet; 2 tablets every am and one every pm  Dispense: 270 tablet; Refill: 3         Assessment & Plan  1. Diabetes Mellitus.  His blood glucose levels have been fluctuating between 210 and 430, with higher readings occurring postprandially or after consuming sweets. He has been on Ozempic 0.25 mg once weekly for the past 4 weeks, which he tolerates well without experiencing any nausea, vomiting, or constipation. He has been adhering to a low-carbohydrate diet, consuming approximately 60 grams of carbohydrates per meal. He recently consulted with a diabetic educator. He lost his [employer] 3 reader and is unable to get a replacement for another 3 years. He has not experienced any episodes of hypoglycemia. He is advised to continue his current regimen of Ozempic 0.25 mg once weekly and U-500 insulin. He is encouraged to monitor his portion  sizes and maintain a low-carbohydrate diet.    2. Hypertension.  His blood pressure was elevated during his last visit, which he attributes to his consumption of ravioli, a high-sodium food. He is advised to limit his intake of salt and caffeine. The dosage of metoprolol tartrate will be increased to 2 tablets in the morning and 1 tablet at night. A prescription for a 1-month supply of metoprolol tartrate has been sent to [facility]. If his blood pressure remains uncontrolled, the dosage may be further increased to 2 tablets twice daily, and an additional antihypertensive medication may be considered.    3. Health Maintenance.  He is advised to receive his influenza vaccine at the pharmacy. He is also advised to schedule an echocardiogram prior to his appointment with Dr. Gallegos.    Follow-up  The patient will follow up in 4 weeks.                  FOLLOW UP  Return in about 4 weeks (around 2/18/2025).  Patient was given instructions and counseling regarding his condition or for health maintenance advice. Please see specific information pulled into the AVS if appropriate.     Patient or patient representative verbalized consent for the use of Ambient Listening during the visit with  ALEENA Urbina for chart documentation. 1/21/2025  14:04 ALEENA Chan  01/21/25  14:04 ASHWINI

## 2025-01-21 NOTE — PATIENT INSTRUCTIONS
See in 4 weeks recheck blood pressure goal 140/90 and below increase Lopressor as instructed decrease salt and caffeine intake.  Call to get echo rescheduled  Follow-up with gastro as scheduled but try to expedite echo appointment so colonoscopy can be done  Follow-up with diabetic nurse practitioner Juan Huggins as scheduled  Follow-up with cardiology as planned

## 2025-01-21 NOTE — TELEPHONE ENCOUNTER
Patient called office to let Salvador know that he has downloaded the [employer] 3 luis to his phone.    I tried to explain how to connect to the office but he said he hasn't gotten that far yet.  Just wanted you to know he has the luis downloaded

## 2025-02-09 NOTE — PROGRESS NOTES
Chief Complaint  Diabetes (Med mgt, A1c eval, cgm eval)    Referred By: BUBBA Urbina*    Subjective          Patient or patient representative verbalized consent for the use of Ambient Listening during the visit with  ALEENA Dodson for chart documentation. 2/12/2025  13:47 ASHWINI Thomas presents to [facility] for diabetes medication management    History of Present Illness    History of Present Illness  The patient presents for evaluation of type 2 diabetes.    He reports no current concerns related to his diabetes. He has been experiencing hypoglycemic episodes at night, which have been disrupting his sleep. His blood glucose levels tend to elevate postprandially, particularly after consuming large meals or carbohydrate-rich foods. He typically consumes his largest meal of the day around noon, although this can vary between 10:30 AM and 3:00 PM. He has expressed dissatisfaction with the accuracy of his continuous glucose monitor (CGM), citing a lag in its readings. He has previously recorded A1c levels in the double digits. In response to these episodes, he has reduced his evening dose of U-500 insulin from 40 units to 35 units, while maintaining his morning dose at 70 units. He is also on Ozempic 0.25 mg, with plans to increase the dosage to 0.5 mg after his next administration. He has not yet refilled his Ozempic prescription.    MEDICATIONS  Current: Lisinopril, U-500 insulin, Ozempic    IMMUNIZATIONS  He received influenza vaccine.      Visit type:  follow-up  Diabetes type:  Type 2  Current diabetes status/concerns/issues:  No concerns  Other health concerns: No new health concerns  Current Diabetes symptoms:    Polyuria: Yes     Polydipsia: No   Polyphagia: No   Blurred vision: Yes     Excessive fatigue: Yes    Known Diabetes complications:  Neuropathy: Numbness, Tingling, Burning, and Shooting Pain; Location: Feet, Hands, and Bilateral  Renal: No current  eGFR available and Microalbuminuria - POSITIVE  Eyes: No current eye exam available in record; Location: N/A  Amputation/Wounds:  Slow to heal  GI: None  Cardiovascular: Hypertension and Hyperlipidemia  ED: None  Other: None  Hypoglycemia:  Level 1 hypoglycemia (54 mg/dL - 70 mg/dL); Frequency - 7% per CGM and Level 2 (less than 54 mg/dL); Frequency - 1% per CGM  Hypoglycemia Symptoms:   Occurred during the night  Current diabetes treatment:  Humulin R U-500 70 units every morning and 35 units every evening, Ozempic 0.25 mg weekly  Blood glucose device:  Storenvy CGM  Blood glucose monitoring frequency:  Continuous per CGM  Blood glucose range/average:  The 14-day sensor report shows an average glucose of 133 mg/dL, with 75% in target range ( mgdL), 16% in the high range (181-250 mg/dL), 1% in the very high range (>250 mg/dL), 7% in the low range (54-70 mg/dL) and 1% in the very low range (<54 mg/dL).   Glucose Source: Device Reviewed  Diet:  Limits high carb/sweet foods, Avoids sugary drinks, Number of meals each day - 3; Number of snacks each day - occasional  Activity/Exercise:  None    Past Medical History:   Diagnosis Date    COPD (chronic obstructive pulmonary disease)     Diabetes     Hypertension      Past Surgical History:   Procedure Laterality Date    ROTATOR CUFF REPAIR Bilateral      Family History   Adopted: Yes   Problem Relation Age of Onset    Skin cancer Daughter      Social History     Socioeconomic History    Marital status:    Tobacco Use    Smoking status: Never     Passive exposure: Never    Smokeless tobacco: Never   Vaping Use    Vaping status: Never Used   Substance and Sexual Activity    Alcohol use: Never    Drug use: Never    Sexual activity: Defer     No Known Allergies    Current Outpatient Medications:     apixaban (Eliquis) 5 MG tablet tablet, Take 1 tablet by mouth Every 12 (Twelve) Hours., Disp: 180 tablet, Rfl: 3    Cholecalciferol (Vitamin D3 Super Strength) 50 MCG  (2000 UT) tablet, Take 1 tablet by mouth Every Morning., Disp: 30 each, Rfl: 11    dilTIAZem CD (CARDIZEM CD) 240 MG 24 hr capsule, Take 1 capsule by mouth Daily., Disp: 90 capsule, Rfl: 3    gabapentin (NEURONTIN) 300 MG capsule, Take 1 capsule by mouth 2 (Two) Times a Day., Disp: 90 capsule, Rfl: 3    Glucagon (Gvoke HypoPen 2-Pack) 1 MG/0.2ML solution auto-injector, Inject 1 mg under the skin into the appropriate area as directed 1 (One) Time As Needed (prn hypoglycemia) for up to 1 dose., Disp: 0.4 mL, Rfl: 2    HumuLIN R U-500 KwikPen 500 UNIT/ML solution pen-injector CONCENTRATED injection, Inject 110 Units under the skin into the appropriate area as directed 2 (Two) Times a Day., Disp: , Rfl:     Insulin Pen Needle 31G X 8 MM misc, Inject 1 Needle as directed 2 (Two) Times a Day., Disp: 90 each, Rfl: 5    lisinopril (PRINIVIL,ZESTRIL) 40 MG tablet, Take 1 tablet by mouth Daily., Disp: , Rfl:     metoprolol tartrate (LOPRESSOR) 25 MG tablet, 2 tablets every am and one every pm, Disp: 270 tablet, Rfl: 3    mupirocin (BACTROBAN) 2 % ointment, Apply 1 Application topically to the appropriate area as directed 3 (Three) Times a Day. Apply to affected area., Disp: 22 g, Rfl: 1    polyethylene glycol-electrolytes (NULYTELY) 420 g solution, MIX AND DRINK AS DIRECTED, Disp: , Rfl:     Semaglutide,0.25 or 0.5MG/DOS, (Ozempic, 0.25 or 0.5 MG/DOSE,) 2 MG/3ML solution pen-injector, Inject 0.5 mg under the skin into the appropriate area as directed 1 (One) Time Per Week., Disp: 3 mL, Rfl: 1    Semaglutide,0.25 or 0.5MG/DOS, (Ozempic, 0.25 or 0.5 MG/DOSE,) 2 MG/3ML solution pen-injector, Inject 0.25 mg under the skin into the appropriate area as directed 1 (One) Time Per Week., Disp: 3 mL, Rfl: 0    Objective     Vitals:    02/12/25 1309   BP: 119/60   BP Location: Right arm   Patient Position: Sitting   Cuff Size: Large Adult   Pulse: 90   Temp: 98.2 °F (36.8 °C)   TempSrc: Temporal   SpO2: 99%   Weight: (!) 148 kg (327 lb  "3.2 oz)   Height: 175.3 cm (69\")     Body mass index is 48.32 kg/m².    Physical Exam  Constitutional:       Appearance: Normal appearance. He is obese.      Comments: Severe Obesity (BMI >= 40) Pt Current BMI = 48.32      HENT:      Head: Normocephalic and atraumatic.      Right Ear: External ear normal.      Left Ear: External ear normal.      Nose: Nose normal.   Eyes:      Extraocular Movements: Extraocular movements intact.      Conjunctiva/sclera: Conjunctivae normal.   Pulmonary:      Effort: Pulmonary effort is normal.   Musculoskeletal:         General: Normal range of motion.      Cervical back: Normal range of motion.   Skin:     General: Skin is warm and dry.   Neurological:      General: No focal deficit present.      Mental Status: He is alert and oriented to person, place, and time. Mental status is at baseline.   Psychiatric:         Mood and Affect: Mood normal.         Behavior: Behavior normal.         Thought Content: Thought content normal.         Judgment: Judgment normal.         Result Review :   The following data was reviewed by: ALEENA Dodson on 02/12/2025:    Most Recent A1C          2/12/2025    13:13   HGBA1C Most Recent   Hemoglobin A1C 7.3        A1C Last 3 Results          9/16/2024    00:00 1/15/2025    14:49 2/12/2025    13:13   HGBA1C Last 3 Results   Hemoglobin A1C 6.90     7.6  7.3       Details          This result is from an external source.             A1c collected in the office today is 7.3%, indicating Uncontrolled Type II diabetes.  This result is down from the prior result of 7.6% collected on 1/15/2025    Glucose   Date Value Ref Range Status   02/12/2025 173 (H) 70 - 99 mg/dL Final     Comment:     Serial Number: [id number]:  [id number]     Point of care glucose in the office today is within normal limits for nonfasting glucose    Microalbumin, Urine   Date Value Ref Range Status   01/15/2025 23.5 mg/dL Final     Creatinine, Urine   Date Value Ref Range " Status   01/15/2025 112.4 mg/dL Final     Microalbumin/Creatinine Ratio   Date Value Ref Range Status   01/15/2025 209.1 (H) 0.0 - 29.0 mg/g Final     Urine microalbuminuria collected on 1/15/2025 is positive for microalbuminuria    HDL Cholesterol   Date Value Ref Range Status   09/16/2024 43 >=40 mg/dL Final     LDL Cholesterol    Date Value Ref Range Status   09/16/2024 120.8 <=130.0 mg/dL Final     Comment:     Desirable range <100 mg/dL for patients with CHD or diabetes and <70   mg/dL for diabetic patients with known heart disease.     Lipid panel collected on 9/16/2024 shows Hyperlipidemia            Diagnoses and all orders for this visit:    1. Type 2 diabetes mellitus with hyperglycemia, with long-term current use of insulin (Primary)  -     POC Glycosylated Hemoglobin (Hb A1C)    2. Type 2 diabetes mellitus with diabetic microalbuminuria, with long-term current use of insulin    3. Class 3 severe obesity due to excess calories with serious comorbidity and body mass index (BMI) of 45.0 to 49.9 in adult    Other orders  -     POC Glucose        Assessment & Plan  1. Type 2 diabetes mellitus.  His average blood glucose level over the past 14 days is 133 mg/dL, with a glucose management indicator (GMI) of 6.5 percent. The variability in his readings is 36.2 percent, which is slightly above the desired range of less than 36 percent. He remains within the target blood glucose range of 70 to 180 mg/dL approximately 75 percent of the time, with elevated levels between 181 and 250 mg/dL occurring 16 percent of the time, typically postprandially. Extremely high levels, exceeding 250 mg/dL, are observed 1 percent of the time. However, he experiences hypoglycemia, with levels between 54 and 69 mg/dL, 7 percent of the time, and severe hypoglycemia, with levels below 54 mg/dL, 1 percent of the time, predominantly during the night. His blood glucose levels tend to increase after breakfast, starting around 6 AM, peaking  between 11 and 11:30 AM, and then returning to the target range. His A1c level has decreased from 7.6 percent on 01/15/2025 to 7.3 percent today. He will continue his current regimen of U-500 insulin, taking 70 units in the morning and 35 units in the evening. The dosage of Ozempic will be increased from 0.25 mg to 0.5 mg. He is advised to monitor his blood glucose levels closely, especially during the night, and to use over patches for his CGM sensors to ensure better adherence.    Follow-up  The patient will follow up in 2 months.    The patient will monitor his blood glucose levels per continuous glucose monitor.  If he develops problematic hyperglycemia or hypoglycemia or adverse drug reactions, he will contact the office for further instructions.        Follow Up     Return in about 2 months (around 4/12/2025) for CGM Follow-Up, Medication Management.    Patient was given instructions and counseling regarding his condition or for health maintenance advice. Please see specific information pulled into the AVS if appropriate.     Salvador Huggins, ALEENA  02/12/2025    Dictated Utilizing Dragon Dictation.  Please note that portions of this note were completed with a voice recognition program.  Part of this note may be an electronic transcription/translation of spoken language to printed text using the Dragon Dictation System.

## 2025-02-12 ENCOUNTER — OFFICE VISIT (OUTPATIENT)
Dept: DIABETES SERVICES | Facility: HOSPITAL | Age: 75
End: 2025-02-12
Payer: MEDICARE

## 2025-02-12 VITALS
SYSTOLIC BLOOD PRESSURE: 119 MMHG | DIASTOLIC BLOOD PRESSURE: 60 MMHG | BODY MASS INDEX: 46.65 KG/M2 | TEMPERATURE: 98.2 F | WEIGHT: 315 LBS | HEIGHT: 69 IN | HEART RATE: 90 BPM | OXYGEN SATURATION: 99 %

## 2025-02-12 DIAGNOSIS — Z79.4 TYPE 2 DIABETES MELLITUS WITH HYPERGLYCEMIA, WITH LONG-TERM CURRENT USE OF INSULIN: Primary | ICD-10-CM

## 2025-02-12 DIAGNOSIS — E66.01 CLASS 3 SEVERE OBESITY DUE TO EXCESS CALORIES WITH SERIOUS COMORBIDITY AND BODY MASS INDEX (BMI) OF 45.0 TO 49.9 IN ADULT: ICD-10-CM

## 2025-02-12 DIAGNOSIS — E11.29 TYPE 2 DIABETES MELLITUS WITH DIABETIC MICROALBUMINURIA, WITH LONG-TERM CURRENT USE OF INSULIN: ICD-10-CM

## 2025-02-12 DIAGNOSIS — E66.813 CLASS 3 SEVERE OBESITY DUE TO EXCESS CALORIES WITH SERIOUS COMORBIDITY AND BODY MASS INDEX (BMI) OF 45.0 TO 49.9 IN ADULT: ICD-10-CM

## 2025-02-12 DIAGNOSIS — Z79.4 TYPE 2 DIABETES MELLITUS WITH DIABETIC MICROALBUMINURIA, WITH LONG-TERM CURRENT USE OF INSULIN: ICD-10-CM

## 2025-02-12 DIAGNOSIS — R80.9 TYPE 2 DIABETES MELLITUS WITH DIABETIC MICROALBUMINURIA, WITH LONG-TERM CURRENT USE OF INSULIN: ICD-10-CM

## 2025-02-12 DIAGNOSIS — E11.65 TYPE 2 DIABETES MELLITUS WITH HYPERGLYCEMIA, WITH LONG-TERM CURRENT USE OF INSULIN: Primary | ICD-10-CM

## 2025-02-12 LAB
EXPIRATION DATE: ABNORMAL
GLUCOSE BLDC GLUCOMTR-MCNC: 173 MG/DL (ref 70–99)
HBA1C MFR BLD: 7.3 % (ref 4.5–5.7)
Lab: ABNORMAL

## 2025-02-12 PROCEDURE — 3078F DIAST BP <80 MM HG: CPT

## 2025-02-12 PROCEDURE — 82948 REAGENT STRIP/BLOOD GLUCOSE: CPT

## 2025-02-12 PROCEDURE — 3051F HG A1C>EQUAL 7.0%<8.0%: CPT

## 2025-02-12 PROCEDURE — 1160F RVW MEDS BY RX/DR IN RCRD: CPT

## 2025-02-12 PROCEDURE — 83036 HEMOGLOBIN GLYCOSYLATED A1C: CPT

## 2025-02-12 PROCEDURE — 1159F MED LIST DOCD IN RCRD: CPT

## 2025-02-12 PROCEDURE — 99214 OFFICE O/P EST MOD 30 MIN: CPT

## 2025-02-12 PROCEDURE — 95251 CONT GLUC MNTR ANALYSIS I&R: CPT

## 2025-02-12 PROCEDURE — 3074F SYST BP LT 130 MM HG: CPT

## 2025-02-12 PROCEDURE — G0463 HOSPITAL OUTPT CLINIC VISIT: HCPCS

## 2025-04-04 ENCOUNTER — PRIOR AUTHORIZATION (OUTPATIENT)
Age: 75
End: 2025-04-04
Payer: MEDICARE

## 2025-04-04 NOTE — TELEPHONE ENCOUNTER
[facility] FOR [facility] FOLLOWING MEDICATION    Semaglutide,0.25 or 0.5MG/DOS, (Ozempic, 0.25 or 0.5 MG/DOSE,) 2 MG/3ML solution pen-injector (01/16/2025)     Key: [id number]

## 2025-04-07 NOTE — TELEPHONE ENCOUNTER
PA APPROVED    OZEMPIC INJ 2MG/3ML, use as directed, is approved through 12/31/2025 under your Medicare Part D benefit.      ENDOCRINOLOGY - SCAN - OZEMPIC APPROVAL LETTER (04/07/2025)